# Patient Record
Sex: FEMALE | Race: WHITE | Employment: OTHER | URBAN - METROPOLITAN AREA
[De-identification: names, ages, dates, MRNs, and addresses within clinical notes are randomized per-mention and may not be internally consistent; named-entity substitution may affect disease eponyms.]

---

## 2017-01-11 ENCOUNTER — HOSPITAL ENCOUNTER (EMERGENCY)
Age: 42
Discharge: HOME | End: 2017-01-11
Attending: EMERGENCY MEDICINE
Payer: COMMERCIAL

## 2017-01-11 ENCOUNTER — APPOINTMENT (OUTPATIENT)
Dept: GENERAL RADIOLOGY | Age: 42
End: 2017-01-11
Attending: EMERGENCY MEDICINE
Payer: COMMERCIAL

## 2017-01-11 ENCOUNTER — APPOINTMENT (OUTPATIENT)
Dept: CV DIAGNOSTICS | Age: 42
End: 2017-01-11
Attending: EMERGENCY MEDICINE
Payer: COMMERCIAL

## 2017-01-11 VITALS
HEIGHT: 63 IN | RESPIRATION RATE: 16 BRPM | OXYGEN SATURATION: 96 % | BODY MASS INDEX: 31.89 KG/M2 | TEMPERATURE: 99 F | DIASTOLIC BLOOD PRESSURE: 77 MMHG | HEART RATE: 86 BPM | SYSTOLIC BLOOD PRESSURE: 129 MMHG | WEIGHT: 180 LBS

## 2017-01-11 DIAGNOSIS — R06.00 DYSPNEA ON EXERTION: Primary | ICD-10-CM

## 2017-01-11 LAB
ALBUMIN SERPL-MCNC: 3.9 G/DL (ref 3.5–4.8)
ALP LIVER SERPL-CCNC: 66 U/L (ref 37–98)
ALT SERPL-CCNC: 28 U/L (ref 14–54)
AST SERPL-CCNC: 14 U/L (ref 15–41)
ATRIAL RATE: 67 BPM
BASOPHILS # BLD AUTO: 0.04 X10(3) UL (ref 0–0.1)
BASOPHILS NFR BLD AUTO: 0.5 %
BILIRUB SERPL-MCNC: 0.4 MG/DL (ref 0.1–2)
BUN BLD-MCNC: 12 MG/DL (ref 8–20)
CALCIUM BLD-MCNC: 8.9 MG/DL (ref 8.3–10.3)
CHLORIDE: 104 MMOL/L (ref 101–111)
CO2: 26 MMOL/L (ref 22–32)
CREAT BLD-MCNC: 0.75 MG/DL (ref 0.55–1.02)
D-DIMER: <0.27 UG/ML FEU (ref 0–0.49)
EOSINOPHIL # BLD AUTO: 0.27 X10(3) UL (ref 0–0.3)
EOSINOPHIL NFR BLD AUTO: 3.4 %
ERYTHROCYTE [DISTWIDTH] IN BLOOD BY AUTOMATED COUNT: 12.8 % (ref 11.5–16)
GLUCOSE BLD-MCNC: 108 MG/DL (ref 70–99)
HCT VFR BLD AUTO: 37.8 % (ref 34–50)
HGB BLD-MCNC: 12.6 G/DL (ref 12–16)
IMMATURE GRANULOCYTE COUNT: 0.02 X10(3) UL (ref 0–1)
IMMATURE GRANULOCYTE RATIO %: 0.3 %
LYMPHOCYTES # BLD AUTO: 2.13 X10(3) UL (ref 0.9–4)
LYMPHOCYTES NFR BLD AUTO: 26.8 %
M PROTEIN MFR SERPL ELPH: 7.9 G/DL (ref 6.1–8.3)
MCH RBC QN AUTO: 28.6 PG (ref 27–33.2)
MCHC RBC AUTO-ENTMCNC: 33.3 G/DL (ref 31–37)
MCV RBC AUTO: 85.7 FL (ref 81–100)
MONOCYTES # BLD AUTO: 0.42 X10(3) UL (ref 0.1–0.6)
MONOCYTES NFR BLD AUTO: 5.3 %
NEUTROPHIL ABS PRELIM: 5.06 X10 (3) UL (ref 1.3–6.7)
NEUTROPHILS # BLD AUTO: 5.06 X10(3) UL (ref 1.3–6.7)
NEUTROPHILS NFR BLD AUTO: 63.7 %
P AXIS: 39 DEGREES
P-R INTERVAL: 160 MS
PLATELET # BLD AUTO: 306 10(3)UL (ref 150–450)
POTASSIUM SERPL-SCNC: 4 MMOL/L (ref 3.6–5.1)
PRO-BETA NATRIURETIC PEPTIDE: 22 PG/ML (ref ?–125)
Q-T INTERVAL: 398 MS
QRS DURATION: 94 MS
QTC CALCULATION (BEZET): 420 MS
R AXIS: 47 DEGREES
RBC # BLD AUTO: 4.41 X10(6)UL (ref 3.8–5.1)
RED CELL DISTRIBUTION WIDTH-SD: 40 FL (ref 35.1–46.3)
SODIUM SERPL-SCNC: 138 MMOL/L (ref 136–144)
T AXIS: 5 DEGREES
TROPONIN: <0.046 NG/ML (ref ?–0.05)
VENTRICULAR RATE: 67 BPM
WBC # BLD AUTO: 7.9 X10(3) UL (ref 4–13)

## 2017-01-11 PROCEDURE — 83880 ASSAY OF NATRIURETIC PEPTIDE: CPT | Performed by: EMERGENCY MEDICINE

## 2017-01-11 PROCEDURE — 85378 FIBRIN DEGRADE SEMIQUANT: CPT | Performed by: EMERGENCY MEDICINE

## 2017-01-11 PROCEDURE — 84484 ASSAY OF TROPONIN QUANT: CPT | Performed by: EMERGENCY MEDICINE

## 2017-01-11 PROCEDURE — 93005 ELECTROCARDIOGRAM TRACING: CPT

## 2017-01-11 PROCEDURE — 85025 COMPLETE CBC W/AUTO DIFF WBC: CPT | Performed by: EMERGENCY MEDICINE

## 2017-01-11 PROCEDURE — 93017 CV STRESS TEST TRACING ONLY: CPT

## 2017-01-11 PROCEDURE — 93010 ELECTROCARDIOGRAM REPORT: CPT

## 2017-01-11 PROCEDURE — 71020 XR CHEST PA + LAT CHEST (CPT=71020): CPT

## 2017-01-11 PROCEDURE — 93350 STRESS TTE ONLY: CPT | Performed by: INTERNAL MEDICINE

## 2017-01-11 PROCEDURE — 96374 THER/PROPH/DIAG INJ IV PUSH: CPT

## 2017-01-11 PROCEDURE — 93018 CV STRESS TEST I&R ONLY: CPT | Performed by: INTERNAL MEDICINE

## 2017-01-11 PROCEDURE — 99285 EMERGENCY DEPT VISIT HI MDM: CPT

## 2017-01-11 PROCEDURE — 80053 COMPREHEN METABOLIC PANEL: CPT | Performed by: EMERGENCY MEDICINE

## 2017-01-11 PROCEDURE — 93350 STRESS TTE ONLY: CPT

## 2017-01-11 NOTE — PROGRESS NOTES
CARDIAC DIAGNOSTICS PRELIMINARY REPORT    No ST changes noted before, during, or after exercise. Rest: heart rate was 71 BPM, blood pressure was 118/74 mmHg, EKG showed NSR    Patient exercised for 9:55 minutes on a Adi protocol.  Patient achieved peak

## 2017-01-11 NOTE — ED INITIAL ASSESSMENT (HPI)
Couple days ago, felt \"pounding\" in the chest, felt \"crappy\" afterwards. Denies any cold symptoms. Currently feels \"tightness\" to chest. Non radiating. No YOSVANY.  Skin PWD

## 2017-01-11 NOTE — ED PROVIDER NOTES
Patient Seen in: [facility] Emergency Department In [place]    History   Patient presents with:  Chest Pain Angina (cardiovascular)    Stated Complaint: chest pain/sob    HPI    54-year-old white female who presents to the emergency room today for complai Grandmother    • Obesity Maternal Grandmother    • Psychiatric Maternal Grandfather    • Heart Disorder Maternal Grandfather          Smoking Status: Never Smoker                      Smokeless Status: Never Used                        Alcohol Use: Yes Normal   D-DIMER - Normal    Narrative:      FEU = Fibrinogen Equivalent Units.     In non-pregnant females:  D-Dimer results of less than 0.5 ug/mL (FEU) have been shown to contribute to  the exclusion of venous thrombolism with a negative predictive value the monitor. Her workup here was unremarkable. Patient is not anemic her d-dimer is negative her chest x-ray is negative.   Troponin is negative EKG was normal.  I spoke to the cardiologist who is on-call for Chad 2 and we discussed the case

## 2017-02-07 PROBLEM — Z86.19 HISTORY OF LYME DISEASE: Status: ACTIVE | Noted: 2017-02-07

## 2017-02-07 PROCEDURE — 36415 COLL VENOUS BLD VENIPUNCTURE: CPT | Performed by: PHYSICIAN ASSISTANT

## 2017-02-07 PROCEDURE — 84443 ASSAY THYROID STIM HORMONE: CPT | Performed by: PHYSICIAN ASSISTANT

## 2017-02-20 PROBLEM — K21.9 LPRD (LARYNGOPHARYNGEAL REFLUX DISEASE): Status: ACTIVE | Noted: 2017-02-20

## 2017-02-20 PROBLEM — R09.A2 GLOBUS PHARYNGEUS: Status: ACTIVE | Noted: 2017-02-20

## 2017-02-20 PROBLEM — R09.89 GLOBUS PHARYNGEUS: Status: ACTIVE | Noted: 2017-02-20

## 2017-02-20 PROBLEM — R19.8 GLOBUS PHARYNGEUS: Status: ACTIVE | Noted: 2017-02-20

## 2019-05-23 PROCEDURE — 87624 HPV HI-RISK TYP POOLED RSLT: CPT | Performed by: OBSTETRICS & GYNECOLOGY

## 2019-05-23 PROCEDURE — 82157 ASSAY OF ANDROSTENEDIONE: CPT | Performed by: OBSTETRICS & GYNECOLOGY

## 2019-05-23 PROCEDURE — 84403 ASSAY OF TOTAL TESTOSTERONE: CPT | Performed by: OBSTETRICS & GYNECOLOGY

## 2019-05-23 PROCEDURE — 84402 ASSAY OF FREE TESTOSTERONE: CPT | Performed by: OBSTETRICS & GYNECOLOGY

## 2019-05-23 PROCEDURE — 88175 CYTOPATH C/V AUTO FLUID REDO: CPT | Performed by: OBSTETRICS & GYNECOLOGY

## 2019-05-23 PROCEDURE — 83498 ASY HYDROXYPROGESTERONE 17-D: CPT | Performed by: OBSTETRICS & GYNECOLOGY

## 2019-07-03 PROCEDURE — 88305 TISSUE EXAM BY PATHOLOGIST: CPT | Performed by: OBSTETRICS & GYNECOLOGY

## 2021-04-08 RX ORDER — MAGNESIUM GLUCONATE 27 MG(500)
400 TABLET ORAL 2 TIMES DAILY
COMMUNITY

## 2021-04-09 ENCOUNTER — HOSPITAL ENCOUNTER (OUTPATIENT)
Dept: GENERAL RADIOLOGY | Age: 46
Discharge: HOME | End: 2021-04-09
Attending: SPECIALIST
Payer: COMMERCIAL

## 2021-04-09 DIAGNOSIS — R05.9 COUGH: ICD-10-CM

## 2021-04-09 PROCEDURE — 71046 X-RAY EXAM CHEST 2 VIEWS: CPT | Performed by: SPECIALIST

## 2021-04-13 ENCOUNTER — LAB ENCOUNTER (OUTPATIENT)
Dept: LAB | Facility: HOSPITAL | Age: 46
End: 2021-04-13
Attending: SPECIALIST
Payer: COMMERCIAL

## 2021-04-16 ENCOUNTER — HOSPITAL ENCOUNTER (OUTPATIENT)
Dept: GENERAL RADIOLOGY | Facility: HOSPITAL | Age: 46
Discharge: HOME | End: 2021-04-16
Attending: SPECIALIST
Payer: COMMERCIAL

## 2021-04-16 ENCOUNTER — NURSE ONLY (OUTPATIENT)
Dept: LAB | Facility: HOSPITAL | Age: 46
End: 2021-04-16
Attending: SPECIALIST
Payer: COMMERCIAL

## 2021-04-16 VITALS
WEIGHT: 208 LBS | RESPIRATION RATE: 18 BRPM | DIASTOLIC BLOOD PRESSURE: 68 MMHG | HEART RATE: 43 BPM | BODY MASS INDEX: 36.86 KG/M2 | HEIGHT: 63 IN | SYSTOLIC BLOOD PRESSURE: 135 MMHG | TEMPERATURE: 98 F | OXYGEN SATURATION: 100 %

## 2021-04-16 DIAGNOSIS — Z01.812 PRE-PROCEDURE LAB EXAM: Primary | ICD-10-CM

## 2021-04-16 DIAGNOSIS — R51.9 HEADACHE: ICD-10-CM

## 2021-04-16 DIAGNOSIS — R51.9 HEADACHE: Primary | ICD-10-CM

## 2021-04-16 LAB
INR BLD: 1.06 (ref 0.89–1.11)
PSA SERPL DL<=0.01 NG/ML-MCNC: 14.1 SECONDS (ref 12.4–14.6)

## 2021-04-16 PROCEDURE — 87205 SMEAR GRAM STAIN: CPT

## 2021-04-16 PROCEDURE — 86403 PARTICLE AGGLUT ANTBDY SCRN: CPT

## 2021-04-16 PROCEDURE — 87070 CULTURE OTHR SPECIMN AEROBIC: CPT

## 2021-04-16 PROCEDURE — 36415 COLL VENOUS BLD VENIPUNCTURE: CPT

## 2021-04-16 PROCEDURE — 84157 ASSAY OF PROTEIN OTHER: CPT

## 2021-04-16 PROCEDURE — 87116 MYCOBACTERIA CULTURE: CPT

## 2021-04-16 PROCEDURE — 89050 BODY FLUID CELL COUNT: CPT

## 2021-04-16 PROCEDURE — 88108 CYTOPATH CONCENTRATE TECH: CPT

## 2021-04-16 PROCEDURE — 87206 SMEAR FLUORESCENT/ACID STAI: CPT

## 2021-04-16 PROCEDURE — 82945 GLUCOSE OTHER FLUID: CPT

## 2021-04-16 PROCEDURE — 85610 PROTHROMBIN TIME: CPT

## 2021-04-16 PROCEDURE — 62328 DX LMBR SPI PNXR W/FLUOR/CT: CPT | Performed by: SPECIALIST

## 2021-04-21 ENCOUNTER — LAB ENCOUNTER (OUTPATIENT)
Dept: LAB | Facility: HOSPITAL | Age: 46
End: 2021-04-21
Attending: INTERNAL MEDICINE
Payer: COMMERCIAL

## 2021-04-21 ENCOUNTER — OFFICE VISIT (OUTPATIENT)
Dept: RHEUMATOLOGY | Facility: CLINIC | Age: 46
End: 2021-04-21
Payer: COMMERCIAL

## 2021-04-21 VITALS
SYSTOLIC BLOOD PRESSURE: 134 MMHG | RESPIRATION RATE: 16 BRPM | DIASTOLIC BLOOD PRESSURE: 84 MMHG | HEART RATE: 62 BPM | WEIGHT: 208 LBS | HEIGHT: 63 IN | BODY MASS INDEX: 36.86 KG/M2

## 2021-04-21 DIAGNOSIS — M79.642 BILATERAL HAND PAIN: Primary | ICD-10-CM

## 2021-04-21 DIAGNOSIS — M79.641 BILATERAL HAND PAIN: Primary | ICD-10-CM

## 2021-04-21 DIAGNOSIS — R76.8 POSITIVE ANA (ANTINUCLEAR ANTIBODY): ICD-10-CM

## 2021-04-21 DIAGNOSIS — M79.642 BILATERAL HAND PAIN: ICD-10-CM

## 2021-04-21 DIAGNOSIS — M79.641 BILATERAL HAND PAIN: ICD-10-CM

## 2021-04-21 PROCEDURE — 86140 C-REACTIVE PROTEIN: CPT | Performed by: INTERNAL MEDICINE

## 2021-04-21 PROCEDURE — 86431 RHEUMATOID FACTOR QUANT: CPT | Performed by: INTERNAL MEDICINE

## 2021-04-21 PROCEDURE — 86235 NUCLEAR ANTIGEN ANTIBODY: CPT

## 2021-04-21 PROCEDURE — 99244 OFF/OP CNSLTJ NEW/EST MOD 40: CPT | Performed by: INTERNAL MEDICINE

## 2021-04-21 PROCEDURE — 86160 COMPLEMENT ANTIGEN: CPT | Performed by: INTERNAL MEDICINE

## 2021-04-21 PROCEDURE — 3079F DIAST BP 80-89 MM HG: CPT | Performed by: INTERNAL MEDICINE

## 2021-04-21 PROCEDURE — 3075F SYST BP GE 130 - 139MM HG: CPT | Performed by: INTERNAL MEDICINE

## 2021-04-21 PROCEDURE — 86225 DNA ANTIBODY NATIVE: CPT

## 2021-04-21 PROCEDURE — 86038 ANTINUCLEAR ANTIBODIES: CPT

## 2021-04-21 PROCEDURE — 86039 ANTINUCLEAR ANTIBODIES (ANA): CPT

## 2021-04-21 PROCEDURE — 3008F BODY MASS INDEX DOCD: CPT | Performed by: INTERNAL MEDICINE

## 2021-04-21 PROCEDURE — 86200 CCP ANTIBODY: CPT | Performed by: INTERNAL MEDICINE

## 2021-04-21 PROCEDURE — 36415 COLL VENOUS BLD VENIPUNCTURE: CPT

## 2021-04-21 PROCEDURE — 85652 RBC SED RATE AUTOMATED: CPT | Performed by: INTERNAL MEDICINE

## 2021-04-21 NOTE — PROGRESS NOTES
Valente Graf is a 51year old female who presents for Patient presents with:  Consult: Benign intracranial hypertension   .    HPI:   CC: hx of +EMPERATRIZ, fibromyalgia   Consult: referred by MAYA Fitzgerald  Neurologist Dr. Asif Hernandez ([address])    Sridhar Quinones occurred during her menstrual cycle. Blood work:  2013: +EMPERATRIZ, dsDNA 20, ssDNA 179, [employer].  Normal RF, CCP, ESR and CRP  5/2020: normal ESR and CRP  XR R hand: Small focal cortical erosion along the volar aspect of the base of the right third proxima Grandmother    • Lipids Maternal Grandmother    • Obesity Maternal Grandmother    • Arthritis Maternal Grandmother    • Psychiatric Maternal Grandfather    • Heart Disorder Maternal Grandfather    • Ear Problems Maternal Grandfather         mastoid   • Can 5th PIP with bone spur  Wrist: FROM, no pain or swelling or warmth on palpation  Elbow: FROM, no pain or swelling or warmth on palpation  Shoulders: FROM, no pain or swelling or warmth on palpation  Hip: normal log roll, no lateral hip pain, RAPHAEL test neg 1. Small focal cortical erosion along the volar aspect of the base of the right third proximal phalanx is new compared to the January 2014 radiographs.  Possible etiologies include sequela of prior trauma or an inflammatory arthritis such as gout or psori

## 2021-09-14 PROBLEM — R20.9 SKIN SENSATION DISTURBANCE: Status: ACTIVE | Noted: 2021-09-14

## 2021-09-14 PROBLEM — G93.2 BENIGN INTRACRANIAL HYPERTENSION: Status: ACTIVE | Noted: 2021-09-14

## 2021-09-14 PROBLEM — G43.719 INTRACTABLE CHRONIC MIGRAINE WITHOUT AURA: Status: ACTIVE | Noted: 2021-09-14

## 2021-09-14 PROBLEM — M79.602 PAIN IN LEFT ARM: Status: ACTIVE | Noted: 2021-09-14

## 2021-09-30 NOTE — PATIENT INSTRUCTIONS
You were seen today for joint pain and an erosion on one of your fingers  Lets evaluate for rheumatoid arthritis  Blood work today  Due to history of positive EMPERATRIZ lets also work you up for any connective tissue disease.   Will also get some more blood work
no...

## 2021-12-30 PROBLEM — J45.20 MILD INTERMITTENT ASTHMA WITHOUT COMPLICATION (HCC): Status: ACTIVE | Noted: 2021-12-30

## 2021-12-30 PROBLEM — J45.20 MILD INTERMITTENT ASTHMA WITHOUT COMPLICATION: Status: ACTIVE | Noted: 2021-12-30

## 2021-12-30 PROBLEM — R76.8 ANA POSITIVE: Status: ACTIVE | Noted: 2021-12-30

## 2023-01-21 ENCOUNTER — OFFICE VISIT (OUTPATIENT)
Dept: FAMILY MEDICINE CLINIC | Facility: CLINIC | Age: 48
End: 2023-01-21
Payer: COMMERCIAL

## 2023-01-21 VITALS
OXYGEN SATURATION: 97 % | SYSTOLIC BLOOD PRESSURE: 130 MMHG | WEIGHT: 210 LBS | HEART RATE: 74 BPM | HEIGHT: 63.5 IN | TEMPERATURE: 99 F | BODY MASS INDEX: 36.75 KG/M2 | DIASTOLIC BLOOD PRESSURE: 90 MMHG

## 2023-01-21 DIAGNOSIS — J20.9 ACUTE BRONCHITIS, UNSPECIFIED ORGANISM: Primary | ICD-10-CM

## 2023-01-21 LAB
OPERATOR ID: NORMAL
POCT LOT NUMBER: NORMAL
RAPID SARS-COV-2 BY PCR: NOT DETECTED

## 2023-01-21 PROCEDURE — 3008F BODY MASS INDEX DOCD: CPT | Performed by: PHYSICIAN ASSISTANT

## 2023-01-21 PROCEDURE — 3080F DIAST BP >= 90 MM HG: CPT | Performed by: PHYSICIAN ASSISTANT

## 2023-01-21 PROCEDURE — 99213 OFFICE O/P EST LOW 20 MIN: CPT | Performed by: PHYSICIAN ASSISTANT

## 2023-01-21 PROCEDURE — U0002 COVID-19 LAB TEST NON-CDC: HCPCS | Performed by: PHYSICIAN ASSISTANT

## 2023-01-21 PROCEDURE — 3075F SYST BP GE 130 - 139MM HG: CPT | Performed by: PHYSICIAN ASSISTANT

## 2023-01-21 RX ORDER — BENZONATATE 200 MG/1
200 CAPSULE ORAL 3 TIMES DAILY PRN
Qty: 21 CAPSULE | Refills: 0 | Status: SHIPPED | OUTPATIENT
Start: 2023-01-21 | End: 2023-01-28

## 2023-01-21 RX ORDER — ALBUTEROL SULFATE 90 UG/1
AEROSOL, METERED RESPIRATORY (INHALATION)
Qty: 1 EACH | Refills: 0 | Status: SHIPPED | OUTPATIENT
Start: 2023-01-21

## 2023-01-23 ENCOUNTER — APPOINTMENT (OUTPATIENT)
Dept: GENERAL RADIOLOGY | Age: 48
End: 2023-01-23
Attending: PHYSICIAN ASSISTANT
Payer: COMMERCIAL

## 2023-01-23 ENCOUNTER — HOSPITAL ENCOUNTER (OUTPATIENT)
Age: 48
Discharge: HOME | End: 2023-01-23
Payer: COMMERCIAL

## 2023-01-23 VITALS
SYSTOLIC BLOOD PRESSURE: 133 MMHG | RESPIRATION RATE: 24 BRPM | DIASTOLIC BLOOD PRESSURE: 95 MMHG | OXYGEN SATURATION: 100 % | HEART RATE: 98 BPM | TEMPERATURE: 99 F

## 2023-01-23 DIAGNOSIS — J34.89 NASAL CONGESTION WITH RHINORRHEA: ICD-10-CM

## 2023-01-23 DIAGNOSIS — R09.81 NASAL CONGESTION WITH RHINORRHEA: ICD-10-CM

## 2023-01-23 DIAGNOSIS — R05.3 PERSISTENT COUGH: Primary | ICD-10-CM

## 2023-01-23 LAB — SARS-COV-2 RNA RESP QL NAA+PROBE: NOT DETECTED

## 2023-01-23 PROCEDURE — 94640 AIRWAY INHALATION TREATMENT: CPT

## 2023-01-23 PROCEDURE — 99204 OFFICE O/P NEW MOD 45 MIN: CPT

## 2023-01-23 PROCEDURE — 71046 X-RAY EXAM CHEST 2 VIEWS: CPT | Performed by: PHYSICIAN ASSISTANT

## 2023-01-23 PROCEDURE — 99214 OFFICE O/P EST MOD 30 MIN: CPT

## 2023-01-23 RX ORDER — MONTELUKAST SODIUM 10 MG/1
10 TABLET ORAL NIGHTLY
Qty: 30 TABLET | Refills: 0 | Status: SHIPPED | OUTPATIENT
Start: 2023-01-23

## 2023-01-23 RX ORDER — PREDNISONE 20 MG/1
40 TABLET ORAL DAILY
Qty: 6 TABLET | Refills: 0 | Status: SHIPPED | OUTPATIENT
Start: 2023-01-23 | End: 2023-01-29

## 2023-01-23 RX ORDER — ALBUTEROL SULFATE 2.5 MG/3ML
2.5 SOLUTION RESPIRATORY (INHALATION) EVERY 4 HOURS PRN
Qty: 30 EACH | Refills: 0 | Status: SHIPPED | OUTPATIENT
Start: 2023-01-23 | End: 2023-02-22

## 2023-01-23 RX ORDER — IPRATROPIUM BROMIDE AND ALBUTEROL SULFATE 2.5; .5 MG/3ML; MG/3ML
3 SOLUTION RESPIRATORY (INHALATION) ONCE
Status: COMPLETED | OUTPATIENT
Start: 2023-01-23 | End: 2023-01-23

## 2023-01-23 RX ORDER — CODEINE PHOSPHATE AND GUAIFENESIN 10; 100 MG/5ML; MG/5ML
10 SOLUTION ORAL NIGHTLY PRN
Qty: 200 ML | Refills: 0 | Status: SHIPPED | OUTPATIENT
Start: 2023-01-23

## 2023-01-23 RX ORDER — DEXAMETHASONE 4 MG/1
16 TABLET ORAL ONCE
Status: COMPLETED | OUTPATIENT
Start: 2023-01-23 | End: 2023-01-23

## 2023-01-23 NOTE — DISCHARGE INSTRUCTIONS
Please return to the ER/clinic if symptoms worsen. Follow-up with your PCP in 24-48 hours as needed. Use your nebulizer every 4-6 hours as needed. The Decadron will remain in your system the next several days. You may start the additional prednisone on day 2 or 3 after discussing it with your neurologist.  Take the Singulair daily. You may also concurrently take Zyrtec at night if you do the Singulair in the morning. Drink plenty of fluids. Sleep more upright. Recommend Cepacol over-the-counter to help with the repetitive cough reflex. You may use a codeine cough syrup at night but allow 8 hours for operating machinery. If anything changes i.e. increasing fever shortness of breath etc. go directly to the emergency room. Otherwise follow-up with your primary care physician for further evaluation and treatment.

## 2023-01-23 NOTE — ED INITIAL ASSESSMENT (HPI)
C/O COUGH THAT STARTED 4 DAYS. PT REPORTS GOING TO QUICK CARE AND BEING TREATED. PT REPORTS TAKING PRESCRIBED MEDS WITH NO RELIEF OF SYMPTOMS. SYMPTOMS INCLUDED. COUGH, SOB, FEVER, VOICE CHANGE, NASAL CONGESTION.

## 2023-01-25 ENCOUNTER — HOSPITAL ENCOUNTER (INPATIENT)
Facility: HOSPITAL | Age: 48
LOS: 4 days | Discharge: HOME | End: 2023-01-29
Attending: EMERGENCY MEDICINE | Admitting: HOSPITALIST
Payer: COMMERCIAL

## 2023-01-25 ENCOUNTER — APPOINTMENT (OUTPATIENT)
Dept: GENERAL RADIOLOGY | Facility: HOSPITAL | Age: 48
End: 2023-01-25
Payer: COMMERCIAL

## 2023-01-25 DIAGNOSIS — J20.9 ACUTE BRONCHITIS, UNSPECIFIED ORGANISM: ICD-10-CM

## 2023-01-25 DIAGNOSIS — J45.909 ACUTE ASTHMA: Primary | ICD-10-CM

## 2023-01-25 PROBLEM — R79.89 AZOTEMIA: Status: ACTIVE | Noted: 2023-01-25

## 2023-01-25 PROBLEM — R73.9 HYPERGLYCEMIA: Status: ACTIVE | Noted: 2023-01-25

## 2023-01-25 LAB
ADENOVIRUS PCR:: NOT DETECTED
ALBUMIN SERPL-MCNC: 3.8 G/DL (ref 3.4–5)
ALBUMIN/GLOB SERPL: 1.1 {RATIO} (ref 1–2)
ALP LIVER SERPL-CCNC: 77 U/L
ALT SERPL-CCNC: 45 U/L
ANION GAP SERPL CALC-SCNC: 4 MMOL/L (ref 0–18)
AST SERPL-CCNC: 27 U/L (ref 15–37)
B PARAPERT DNA SPEC QL NAA+PROBE: NOT DETECTED
B PERT DNA SPEC QL NAA+PROBE: NOT DETECTED
BASOPHILS # BLD AUTO: 0.02 X10(3) UL (ref 0–0.2)
BASOPHILS NFR BLD AUTO: 0.2 %
BILIRUB SERPL-MCNC: 0.4 MG/DL (ref 0.1–2)
BUN BLD-MCNC: 20 MG/DL (ref 7–18)
C PNEUM DNA SPEC QL NAA+PROBE: NOT DETECTED
CALCIUM BLD-MCNC: 8.8 MG/DL (ref 8.5–10.1)
CHLORIDE SERPL-SCNC: 108 MMOL/L (ref 98–112)
CO2 SERPL-SCNC: 29 MMOL/L (ref 21–32)
CORONAVIRUS 229E PCR:: NOT DETECTED
CORONAVIRUS HKU1 PCR:: NOT DETECTED
CORONAVIRUS NL63 PCR:: NOT DETECTED
CORONAVIRUS OC43 PCR:: NOT DETECTED
CREAT BLD-MCNC: 0.88 MG/DL
EOSINOPHIL # BLD AUTO: 0.09 X10(3) UL (ref 0–0.7)
EOSINOPHIL NFR BLD AUTO: 0.9 %
ERYTHROCYTE [DISTWIDTH] IN BLOOD BY AUTOMATED COUNT: 13.2 %
FLUAV + FLUBV RNA SPEC NAA+PROBE: NEGATIVE
FLUAV + FLUBV RNA SPEC NAA+PROBE: NEGATIVE
FLUAV RNA SPEC QL NAA+PROBE: NOT DETECTED
FLUBV RNA SPEC QL NAA+PROBE: NOT DETECTED
GFR SERPLBLD BASED ON 1.73 SQ M-ARVRAT: 81 ML/MIN/1.73M2 (ref 60–?)
GLOBULIN PLAS-MCNC: 3.6 G/DL (ref 2.8–4.4)
GLUCOSE BLD-MCNC: 114 MG/DL (ref 70–99)
HCT VFR BLD AUTO: 35.8 %
HGB BLD-MCNC: 12.2 G/DL
IMM GRANULOCYTES # BLD AUTO: 0.03 X10(3) UL (ref 0–1)
IMM GRANULOCYTES NFR BLD: 0.3 %
LYMPHOCYTES # BLD AUTO: 2.42 X10(3) UL (ref 1–4)
LYMPHOCYTES NFR BLD AUTO: 23.3 %
MCH RBC QN AUTO: 29 PG (ref 26–34)
MCHC RBC AUTO-ENTMCNC: 34.1 G/DL (ref 31–37)
MCV RBC AUTO: 85.2 FL
METAPNEUMOVIRUS PCR:: DETECTED
MONOCYTES # BLD AUTO: 0.54 X10(3) UL (ref 0.1–1)
MONOCYTES NFR BLD AUTO: 5.2 %
MYCOPLASMA PNEUMONIA PCR:: NOT DETECTED
NEUTROPHILS # BLD AUTO: 7.28 X10 (3) UL (ref 1.5–7.7)
NEUTROPHILS # BLD AUTO: 7.28 X10(3) UL (ref 1.5–7.7)
NEUTROPHILS NFR BLD AUTO: 70.1 %
OSMOLALITY SERPL CALC.SUM OF ELEC: 295 MOSM/KG (ref 275–295)
PARAINFLUENZA 1 PCR:: NOT DETECTED
PARAINFLUENZA 2 PCR:: NOT DETECTED
PARAINFLUENZA 3 PCR:: NOT DETECTED
PARAINFLUENZA 4 PCR:: NOT DETECTED
PLATELET # BLD AUTO: 260 10(3)UL (ref 150–450)
POTASSIUM SERPL-SCNC: 3.6 MMOL/L (ref 3.5–5.1)
PROT SERPL-MCNC: 7.4 G/DL (ref 6.4–8.2)
RBC # BLD AUTO: 4.2 X10(6)UL
RHINOVIRUS/ENTERO PCR:: NOT DETECTED
RSV RNA SPEC NAA+PROBE: NEGATIVE
RSV RNA SPEC QL NAA+PROBE: NOT DETECTED
SARS-COV-2 RNA NPH QL NAA+NON-PROBE: NOT DETECTED
SARS-COV-2 RNA RESP QL NAA+PROBE: NOT DETECTED
SODIUM SERPL-SCNC: 141 MMOL/L (ref 136–145)
WBC # BLD AUTO: 10.4 X10(3) UL (ref 4–11)

## 2023-01-25 PROCEDURE — 99291 CRITICAL CARE FIRST HOUR: CPT

## 2023-01-25 PROCEDURE — 94640 AIRWAY INHALATION TREATMENT: CPT

## 2023-01-25 PROCEDURE — 36415 COLL VENOUS BLD VENIPUNCTURE: CPT

## 2023-01-25 PROCEDURE — 80053 COMPREHEN METABOLIC PANEL: CPT | Performed by: EMERGENCY MEDICINE

## 2023-01-25 PROCEDURE — 0202U NFCT DS 22 TRGT SARS-COV-2: CPT | Performed by: HOSPITALIST

## 2023-01-25 PROCEDURE — 71045 X-RAY EXAM CHEST 1 VIEW: CPT | Performed by: EMERGENCY MEDICINE

## 2023-01-25 PROCEDURE — 0241U SARS-COV-2/FLU A AND B/RSV BY PCR (GENEXPERT): CPT | Performed by: EMERGENCY MEDICINE

## 2023-01-25 PROCEDURE — 99285 EMERGENCY DEPT VISIT HI MDM: CPT

## 2023-01-25 PROCEDURE — 85025 COMPLETE CBC W/AUTO DIFF WBC: CPT | Performed by: EMERGENCY MEDICINE

## 2023-01-25 RX ORDER — ALBUTEROL SULFATE 90 UG/1
8 AEROSOL, METERED RESPIRATORY (INHALATION) 4 TIMES DAILY
Status: DISCONTINUED | OUTPATIENT
Start: 2023-01-25 | End: 2023-01-25

## 2023-01-25 RX ORDER — ONDANSETRON 2 MG/ML
4 INJECTION INTRAMUSCULAR; INTRAVENOUS EVERY 6 HOURS PRN
Status: DISCONTINUED | OUTPATIENT
Start: 2023-01-25 | End: 2023-01-29

## 2023-01-25 RX ORDER — ONDANSETRON 2 MG/ML
4 INJECTION INTRAMUSCULAR; INTRAVENOUS EVERY 4 HOURS PRN
Status: ACTIVE | OUTPATIENT
Start: 2023-01-25 | End: 2023-01-25

## 2023-01-25 RX ORDER — HEPARIN SODIUM 5000 [USP'U]/ML
5000 INJECTION, SOLUTION INTRAVENOUS; SUBCUTANEOUS EVERY 8 HOURS SCHEDULED
Status: DISCONTINUED | OUTPATIENT
Start: 2023-01-25 | End: 2023-01-27

## 2023-01-25 RX ORDER — PROCHLORPERAZINE EDISYLATE 5 MG/ML
5 INJECTION INTRAMUSCULAR; INTRAVENOUS EVERY 8 HOURS PRN
Status: DISCONTINUED | OUTPATIENT
Start: 2023-01-25 | End: 2023-01-29

## 2023-01-25 RX ORDER — METHYLPREDNISOLONE SODIUM SUCCINATE 125 MG/2ML
60 INJECTION, POWDER, LYOPHILIZED, FOR SOLUTION INTRAMUSCULAR; INTRAVENOUS EVERY 8 HOURS
Status: DISCONTINUED | OUTPATIENT
Start: 2023-01-26 | End: 2023-01-29

## 2023-01-25 RX ORDER — IPRATROPIUM BROMIDE AND ALBUTEROL SULFATE 2.5; .5 MG/3ML; MG/3ML
3 SOLUTION RESPIRATORY (INHALATION)
Status: DISCONTINUED | OUTPATIENT
Start: 2023-01-25 | End: 2023-01-29

## 2023-01-25 RX ORDER — PREDNISONE 20 MG/1
60 TABLET ORAL ONCE
Status: COMPLETED | OUTPATIENT
Start: 2023-01-25 | End: 2023-01-25

## 2023-01-25 RX ORDER — MONTELUKAST SODIUM 10 MG/1
10 TABLET ORAL NIGHTLY
Status: DISCONTINUED | OUTPATIENT
Start: 2023-01-25 | End: 2023-01-29

## 2023-01-25 RX ORDER — ALBUTEROL SULFATE 2.5 MG/3ML
2.5 SOLUTION RESPIRATORY (INHALATION) EVERY 4 HOURS PRN
Status: DISCONTINUED | OUTPATIENT
Start: 2023-01-25 | End: 2023-01-29

## 2023-01-25 RX ORDER — MAGNESIUM GLUCONATE 27 MG(500)
400 TABLET ORAL 2 TIMES DAILY
Status: DISCONTINUED | OUTPATIENT
Start: 2023-01-25 | End: 2023-01-25 | Stop reason: RX

## 2023-01-25 RX ORDER — BENZONATATE 200 MG/1
200 CAPSULE ORAL 3 TIMES DAILY PRN
Status: DISCONTINUED | OUTPATIENT
Start: 2023-01-25 | End: 2023-01-29

## 2023-01-25 RX ORDER — CETIRIZINE HYDROCHLORIDE 10 MG/1
10 TABLET ORAL DAILY
Status: DISCONTINUED | OUTPATIENT
Start: 2023-01-26 | End: 2023-01-29

## 2023-01-25 RX ORDER — METHYLPREDNISOLONE SODIUM SUCCINATE 125 MG/2ML
60 INJECTION, POWDER, LYOPHILIZED, FOR SOLUTION INTRAMUSCULAR; INTRAVENOUS EVERY 8 HOURS
Status: DISCONTINUED | OUTPATIENT
Start: 2023-01-26 | End: 2023-01-25

## 2023-01-25 RX ORDER — PREDNISONE 20 MG/1
40 TABLET ORAL DAILY
Qty: 10 TABLET | Refills: 0 | Status: SHIPPED | OUTPATIENT
Start: 2023-01-25 | End: 2023-01-29

## 2023-01-25 RX ORDER — CODEINE PHOSPHATE AND GUAIFENESIN 10; 100 MG/5ML; MG/5ML
5 SOLUTION ORAL EVERY 4 HOURS PRN
Status: DISCONTINUED | OUTPATIENT
Start: 2023-01-25 | End: 2023-01-29

## 2023-01-25 RX ORDER — ACETAMINOPHEN 500 MG
500 TABLET ORAL EVERY 4 HOURS PRN
Status: DISCONTINUED | OUTPATIENT
Start: 2023-01-25 | End: 2023-01-29

## 2023-01-25 NOTE — PROGRESS NOTES
NURSING ADMISSION NOTE      Patient admitted via Cart  Oriented to room. Safety precautions initiated. Bed in low position. Call light in reach. Received pt around 1730. Pt AOx4. VSS on RA. Tele NSR/ST with exertion. Admission ubaldo completed. Regular diet. Pt up ad shashi.

## 2023-01-25 NOTE — ED PROVIDER NOTES
Post hour-long continuous neb patient still had mild expiratory wheezes. Patient has been on prednisone already. Patient did not feel comfortable going home. Patient be admitted for further evaluation.   Did speak with the duly hospitalist.

## 2023-01-25 NOTE — ED QUICK NOTES
Orders for admission, patient is aware of plan and ready to go upstairs.  Any questions, please call ED RN Nataliya Johnson at extension [phone]     Patient Covid vaccination status: Partially vaccinated     COVID Test Ordered in ED: SARS-CoV-2/Flu A and B/RSV by PCR (GeneXpert) WAS NEG    COVID Suspicion at Admission: N/A    Running Infusions:  None    Mental Status/LOC at time of transport: A&OX3    Other pertinent information:   CIWA score: N/A   NIH score:  N/A

## 2023-01-26 LAB
ANION GAP SERPL CALC-SCNC: 6 MMOL/L (ref 0–18)
BASOPHILS # BLD AUTO: 0.02 X10(3) UL (ref 0–0.2)
BASOPHILS NFR BLD AUTO: 0.2 %
BUN BLD-MCNC: 13 MG/DL (ref 7–18)
CALCIUM BLD-MCNC: 9.3 MG/DL (ref 8.5–10.1)
CHLORIDE SERPL-SCNC: 104 MMOL/L (ref 98–112)
CO2 SERPL-SCNC: 28 MMOL/L (ref 21–32)
CREAT BLD-MCNC: 0.72 MG/DL
EOSINOPHIL # BLD AUTO: 0 X10(3) UL (ref 0–0.7)
EOSINOPHIL NFR BLD AUTO: 0 %
ERYTHROCYTE [DISTWIDTH] IN BLOOD BY AUTOMATED COUNT: 13.1 %
GFR SERPLBLD BASED ON 1.73 SQ M-ARVRAT: 103 ML/MIN/1.73M2 (ref 60–?)
GLUCOSE BLD-MCNC: 169 MG/DL (ref 70–99)
HCT VFR BLD AUTO: 36.7 %
HGB BLD-MCNC: 12.4 G/DL
IMM GRANULOCYTES # BLD AUTO: 0.07 X10(3) UL (ref 0–1)
IMM GRANULOCYTES NFR BLD: 0.6 %
LYMPHOCYTES # BLD AUTO: 1.41 X10(3) UL (ref 1–4)
LYMPHOCYTES NFR BLD AUTO: 12.5 %
MAGNESIUM SERPL-MCNC: 2.3 MG/DL (ref 1.6–2.6)
MCH RBC QN AUTO: 28.7 PG (ref 26–34)
MCHC RBC AUTO-ENTMCNC: 33.8 G/DL (ref 31–37)
MCV RBC AUTO: 85 FL
MONOCYTES # BLD AUTO: 0.27 X10(3) UL (ref 0.1–1)
MONOCYTES NFR BLD AUTO: 2.4 %
NEUTROPHILS # BLD AUTO: 9.53 X10 (3) UL (ref 1.5–7.7)
NEUTROPHILS # BLD AUTO: 9.53 X10(3) UL (ref 1.5–7.7)
NEUTROPHILS NFR BLD AUTO: 84.3 %
OSMOLALITY SERPL CALC.SUM OF ELEC: 290 MOSM/KG (ref 275–295)
PLATELET # BLD AUTO: 289 10(3)UL (ref 150–450)
POTASSIUM SERPL-SCNC: 4.2 MMOL/L (ref 3.5–5.1)
RBC # BLD AUTO: 4.32 X10(6)UL
SODIUM SERPL-SCNC: 138 MMOL/L (ref 136–145)
WBC # BLD AUTO: 11.3 X10(3) UL (ref 4–11)

## 2023-01-26 PROCEDURE — 94640 AIRWAY INHALATION TREATMENT: CPT

## 2023-01-26 PROCEDURE — 80048 BASIC METABOLIC PNL TOTAL CA: CPT | Performed by: HOSPITALIST

## 2023-01-26 PROCEDURE — 85025 COMPLETE CBC W/AUTO DIFF WBC: CPT | Performed by: HOSPITALIST

## 2023-01-26 PROCEDURE — 83735 ASSAY OF MAGNESIUM: CPT | Performed by: HOSPITALIST

## 2023-01-26 RX ORDER — DIPHENHYDRAMINE HCL 25 MG
25 CAPSULE ORAL ONCE
Status: DISCONTINUED | OUTPATIENT
Start: 2023-01-26 | End: 2023-01-29

## 2023-01-26 RX ORDER — FLUTICASONE FUROATE AND VILANTEROL 100; 25 UG/1; UG/1
1 POWDER RESPIRATORY (INHALATION) DAILY
Status: DISCONTINUED | OUTPATIENT
Start: 2023-01-26 | End: 2023-01-29

## 2023-01-26 RX ORDER — MELATONIN
3 NIGHTLY PRN
Status: DISCONTINUED | OUTPATIENT
Start: 2023-01-26 | End: 2023-01-29

## 2023-01-26 NOTE — PROGRESS NOTES
Pharmacy Note: Dietary Supplement Discontinuation Per Policy    Magnesium gluconate has been discontinued on [employer] per policy. This supplement may be restarted upon discharge using the medication reconciliation process.     Thank you,   Nicholas Trevino, PharmD  1/25/2023,  8:27 PM

## 2023-01-26 NOTE — PROGRESS NOTES
Received pt around 0730. Pt AOx4. VSS on RA. Tele NSR. IV steroids, nebs. Prn cough meds. Pt up ad shashi. Tolerating diet. All questions answered.

## 2023-01-26 NOTE — PLAN OF CARE
Problem: Metapneumoia   Data: Patient AX04. Telemetry-sinus. , on room air. 02 sats 97%. Afebrile. Continent. Saline lock. Ambulatory.    Intervention:IV solu medrol, duobnebs, tessalon, robitussin   Education: Medications, call light   Response: cooperative with care     Problem: Patient/Family Goals  Goal: Patient/Family Long Term Goal  Description: Patient's Long Term Goal: Discharge to home   Interventions:  - Follow plan of care   - See additional Care Plan goals for specific interventions  Outcome: Progressing  Goal: Patient/Family Short Term Goal  Description: Patient's Short Term Goal:   1/26 noc: reduce cough     Interventions:   - Medications, call light,      - See additional Care Plan goals for specific interventions  Outcome: Progressing     Problem: RESPIRATORY - ADULT  Goal: Achieves optimal ventilation and oxygenation  Description: INTERVENTIONS:  - Assess for changes in respiratory status  - Assess for changes in mentation and behavior  - Position to facilitate oxygenation and minimize respiratory effort  - Oxygen supplementation based on oxygen saturation or ABGs  - Provide Smoking Cessation handout, if applicable  - Encourage broncho-pulmonary hygiene including cough, deep breathe, Incentive Spirometry  - Assess the need for suctioning and perform as needed  - Assess and instruct to report SOB or any respiratory difficulty  - Respiratory Therapy support as indicated  - Manage/alleviate anxiety  - Monitor for signs/symptoms of CO2 retention  Outcome: Progressing

## 2023-01-27 LAB
ANION GAP SERPL CALC-SCNC: 8 MMOL/L (ref 0–18)
BASOPHILS # BLD AUTO: 0.04 X10(3) UL (ref 0–0.2)
BASOPHILS NFR BLD AUTO: 0.3 %
BUN BLD-MCNC: 14 MG/DL (ref 7–18)
CALCIUM BLD-MCNC: 9.5 MG/DL (ref 8.5–10.1)
CHLORIDE SERPL-SCNC: 104 MMOL/L (ref 98–112)
CO2 SERPL-SCNC: 25 MMOL/L (ref 21–32)
CREAT BLD-MCNC: 0.85 MG/DL
EOSINOPHIL # BLD AUTO: 0 X10(3) UL (ref 0–0.7)
EOSINOPHIL NFR BLD AUTO: 0 %
ERYTHROCYTE [DISTWIDTH] IN BLOOD BY AUTOMATED COUNT: 13 %
GFR SERPLBLD BASED ON 1.73 SQ M-ARVRAT: 84 ML/MIN/1.73M2 (ref 60–?)
GLUCOSE BLD-MCNC: 171 MG/DL (ref 70–99)
HCT VFR BLD AUTO: 37.3 %
HGB BLD-MCNC: 12.3 G/DL
IMM GRANULOCYTES # BLD AUTO: 0.17 X10(3) UL (ref 0–1)
IMM GRANULOCYTES NFR BLD: 1.4 %
LYMPHOCYTES # BLD AUTO: 1.98 X10(3) UL (ref 1–4)
LYMPHOCYTES NFR BLD AUTO: 16.4 %
MAGNESIUM SERPL-MCNC: 2.4 MG/DL (ref 1.6–2.6)
MCH RBC QN AUTO: 28.5 PG (ref 26–34)
MCHC RBC AUTO-ENTMCNC: 33 G/DL (ref 31–37)
MCV RBC AUTO: 86.5 FL
MONOCYTES # BLD AUTO: 0.44 X10(3) UL (ref 0.1–1)
MONOCYTES NFR BLD AUTO: 3.7 %
NEUTROPHILS # BLD AUTO: 9.41 X10 (3) UL (ref 1.5–7.7)
NEUTROPHILS # BLD AUTO: 9.41 X10(3) UL (ref 1.5–7.7)
NEUTROPHILS NFR BLD AUTO: 78.2 %
OSMOLALITY SERPL CALC.SUM OF ELEC: 289 MOSM/KG (ref 275–295)
PLATELET # BLD AUTO: 292 10(3)UL (ref 150–450)
POTASSIUM SERPL-SCNC: 4.2 MMOL/L (ref 3.5–5.1)
RBC # BLD AUTO: 4.31 X10(6)UL
SODIUM SERPL-SCNC: 137 MMOL/L (ref 136–145)
WBC # BLD AUTO: 12 X10(3) UL (ref 4–11)

## 2023-01-27 PROCEDURE — 85025 COMPLETE CBC W/AUTO DIFF WBC: CPT | Performed by: HOSPITALIST

## 2023-01-27 PROCEDURE — 80048 BASIC METABOLIC PNL TOTAL CA: CPT | Performed by: HOSPITALIST

## 2023-01-27 PROCEDURE — 83735 ASSAY OF MAGNESIUM: CPT | Performed by: HOSPITALIST

## 2023-01-27 PROCEDURE — 94640 AIRWAY INHALATION TREATMENT: CPT

## 2023-01-27 RX ORDER — GUAIFENESIN 600 MG/1
600 TABLET, EXTENDED RELEASE ORAL 2 TIMES DAILY
Status: DISCONTINUED | OUTPATIENT
Start: 2023-01-27 | End: 2023-01-29

## 2023-01-27 RX ORDER — ENOXAPARIN SODIUM 100 MG/ML
40 INJECTION SUBCUTANEOUS NIGHTLY
Status: DISCONTINUED | OUTPATIENT
Start: 2023-01-27 | End: 2023-01-29

## 2023-01-27 RX ORDER — GABAPENTIN 300 MG/1
600 CAPSULE ORAL NIGHTLY
Status: DISCONTINUED | OUTPATIENT
Start: 2023-01-27 | End: 2023-01-29

## 2023-01-27 RX ORDER — HYDROCODONE BITARTRATE AND HOMATROPINE METHYLBROMIDE ORAL SOLUTION 5; 1.5 MG/5ML; MG/5ML
5 LIQUID ORAL EVERY 6 HOURS PRN
Status: DISCONTINUED | OUTPATIENT
Start: 2023-01-27 | End: 2023-01-29

## 2023-01-27 NOTE — PROGRESS NOTES
01/27/23 1155   Mobility   O2 walk?  Yes   SPO2% on Room Air at Rest 95   SPO2% Ambulation on Room Air 91

## 2023-01-27 NOTE — PLAN OF CARE
Problem: Metapneumoia   Data: Patient AX04. Telemetry-sinus rhythm. . Room air. On 1L NC overnight as supplement. Diminished breathe sounds. Strong persistent cough. Prn medication given. Melatonin given for sleep. Afebrile. Continent. Regular diet. Saline lock. Ambulatory.    Intervention: IV solu medrol, duobnebs, tessalon, robitussin   Education: Medications, call light   Response: cooperative with care      Problem: Patient/Family Goals  Goal: Patient/Family Long Term Goal  Description: Patient's Long Term Goal: Discharge to home   Interventions:  - Follow plan of care   - See additional Care Plan goals for specific interventions  Outcome: Progressing  Goal: Patient/Family Short Term Goal  Description: Patient's Short Term Goal:   1/26 noc: reduce cough   1/26 noc: reduce cough   1/27 noc; sleep     Interventions:   - Medications, call light,      - See additional Care Plan goals for specific interventions  Outcome: Progressing     Problem: RESPIRATORY - ADULT  Goal: Achieves optimal ventilation and oxygenation  Description: INTERVENTIONS:  - Assess for changes in respiratory status  - Assess for changes in mentation and behavior  - Position to facilitate oxygenation and minimize respiratory effort  - Oxygen supplementation based on oxygen saturation or ABGs  - Provide Smoking Cessation handout, if applicable  - Encourage broncho-pulmonary hygiene including cough, deep breathe, Incentive Spirometry  - Assess the need for suctioning and perform as needed  - Assess and instruct to report SOB or any respiratory difficulty  - Respiratory Therapy support as indicated  - Manage/alleviate anxiety  - Monitor for signs/symptoms of CO2 retention  Outcome: Progressing

## 2023-01-27 NOTE — PLAN OF CARE
1/27 AM: Pt is A/O x 4, wears glasses. Lung sounds are diminished, exp wheezing. Room air at rest and with exertion. Hydromet and Tessalon for cough. BP and HR are WNL, NSR on tele. BM today, voids, up stand by assist. Updated patient and family on POC, no questions/concerns at this time.      Problem: Patient/Family Goals  Goal: Patient/Family Long Term Goal  Description: Patient's Long Term Goal: Discharge to home   Interventions:  - Follow plan of care   - See additional Care Plan goals for specific interventions  Outcome: Progressing  Goal: Patient/Family Short Term Goal  Description: Patient's Short Term Goal:   1/26 noc: reduce cough   1/26 noc: reduce cough   1/27 noc; sleep   1/27 AM: O2 walk, control cough  Interventions:   - Medications, call light,      - See additional Care Plan goals for specific interventions  Outcome: Progressing     Problem: RESPIRATORY - ADULT  Goal: Achieves optimal ventilation and oxygenation  Description: INTERVENTIONS:  - Assess for changes in respiratory status  - Assess for changes in mentation and behavior  - Position to facilitate oxygenation and minimize respiratory effort  - Oxygen supplementation based on oxygen saturation or ABGs  - Provide Smoking Cessation handout, if applicable  - Encourage broncho-pulmonary hygiene including cough, deep breathe, Incentive Spirometry  - Assess the need for suctioning and perform as needed  - Assess and instruct to report SOB or any respiratory difficulty  - Respiratory Therapy support as indicated  - Manage/alleviate anxiety  - Monitor for signs/symptoms of CO2 retention  Outcome: Progressing

## 2023-01-28 PROCEDURE — 94640 AIRWAY INHALATION TREATMENT: CPT

## 2023-01-28 RX ORDER — POLYETHYLENE GLYCOL 3350 17 G/17G
17 POWDER, FOR SOLUTION ORAL DAILY PRN
Status: DISCONTINUED | OUTPATIENT
Start: 2023-01-28 | End: 2023-01-29

## 2023-01-28 RX ORDER — DOCUSATE SODIUM 100 MG/1
100 CAPSULE, LIQUID FILLED ORAL 2 TIMES DAILY PRN
Status: DISCONTINUED | OUTPATIENT
Start: 2023-01-28 | End: 2023-01-29

## 2023-01-28 NOTE — PLAN OF CARE
Patient is A&Ox4. BP occasionally elevated, hospitalist aware. The rest of her vital signs wnl. Tolerating well on room air at rest and with ambulation with O2 sats >90%. Occasionally desats with cough, but recovers to >90% within a few seconds. She c/o SOB with prolonged activity. Tele, NSR/SB. Denies any pain, nausea, or diarrhea at this time. Good PO intake. Voiding. Ad shashi. Plan to ambulate in the rivera later today to evaluate oxygen needs. Receiving IV steroids. Updated on plan of care, no further questions at this time. Will continue to monitor.         Problem: Patient/Family Goals  Goal: Patient/Family Long Term Goal  Description: Patient's Long Term Goal: Discharge to home   Interventions:  - Follow plan of care   - See additional Care Plan goals for specific interventions  Outcome: Progressing  Goal: Patient/Family Short Term Goal  Description: Patient's Short Term Goal:   1/26 noc: reduce cough   1/26 noc: reduce cough   1/27 noc; sleep   1/27 AM: O2 walk, control cough  1/27 noc: manage cough  1/28 am: relieve SOB, ambulate in rivera    Interventions:   - Medications, call light,      - See additional Care Plan goals for specific interventions  Outcome: Progressing     Problem: RESPIRATORY - ADULT  Goal: Achieves optimal ventilation and oxygenation  Description: INTERVENTIONS:  - Assess for changes in respiratory status  - Assess for changes in mentation and behavior  - Position to facilitate oxygenation and minimize respiratory effort  - Oxygen supplementation based on oxygen saturation or ABGs  - Provide Smoking Cessation handout, if applicable  - Encourage broncho-pulmonary hygiene including cough, deep breathe, Incentive Spirometry  - Assess the need for suctioning and perform as needed  - Assess and instruct to report SOB or any respiratory difficulty  - Respiratory Therapy support as indicated  - Manage/alleviate anxiety  - Monitor for signs/symptoms of CO2 retention  Outcome: Progressing

## 2023-01-28 NOTE — PLAN OF CARE
Pt A&O X 4. Room air. Scheduled nebs. Solumedrol. NSR on Tele. Lovenox. PRN Cough meds. Regular diet. Up by self. No further needs at this time.     Problem: Patient/Family Goals  Goal: Patient/Family Long Term Goal  Description: Patient's Long Term Goal: Discharge to home   Interventions:  - Follow plan of care   - See additional Care Plan goals for specific interventions  Outcome: Progressing  Goal: Patient/Family Short Term Goal  Description: Patient's Short Term Goal:   1/26 noc: reduce cough   1/26 noc: reduce cough   1/27 noc; sleep   1/27 AM: O2 walk, control cough  1/27 noc: manage cough  Interventions:   - Medications, call light,      - See additional Care Plan goals for specific interventions  Outcome: Progressing     Problem: RESPIRATORY - ADULT  Goal: Achieves optimal ventilation and oxygenation  Description: INTERVENTIONS:  - Assess for changes in respiratory status  - Assess for changes in mentation and behavior  - Position to facilitate oxygenation and minimize respiratory effort  - Oxygen supplementation based on oxygen saturation or ABGs  - Provide Smoking Cessation handout, if applicable  - Encourage broncho-pulmonary hygiene including cough, deep breathe, Incentive Spirometry  - Assess the need for suctioning and perform as needed  - Assess and instruct to report SOB or any respiratory difficulty  - Respiratory Therapy support as indicated  - Manage/alleviate anxiety  - Monitor for signs/symptoms of CO2 retention  Outcome: Progressing

## 2023-01-29 VITALS
SYSTOLIC BLOOD PRESSURE: 154 MMHG | TEMPERATURE: 98 F | BODY MASS INDEX: 37 KG/M2 | DIASTOLIC BLOOD PRESSURE: 82 MMHG | WEIGHT: 210 LBS | HEART RATE: 59 BPM | OXYGEN SATURATION: 93 % | RESPIRATION RATE: 21 BRPM

## 2023-01-29 PROCEDURE — 94640 AIRWAY INHALATION TREATMENT: CPT

## 2023-01-29 RX ORDER — FLUTICASONE FUROATE AND VILANTEROL 100; 25 UG/1; UG/1
1 POWDER RESPIRATORY (INHALATION) DAILY
Qty: 1 EACH | Refills: 0 | Status: SHIPPED | OUTPATIENT
Start: 2023-01-30 | End: 2023-03-01

## 2023-01-29 RX ORDER — BENZONATATE 200 MG/1
200 CAPSULE ORAL 3 TIMES DAILY PRN
Qty: 21 CAPSULE | Refills: 0 | Status: SHIPPED | OUTPATIENT
Start: 2023-01-29 | End: 2023-02-05

## 2023-01-29 RX ORDER — PREDNISONE 10 MG/1
TABLET ORAL
Qty: 30 TABLET | Refills: 0 | Status: SHIPPED | OUTPATIENT
Start: 2023-01-29

## 2023-01-29 RX ORDER — GUAIFENESIN 600 MG/1
600 TABLET, EXTENDED RELEASE ORAL 2 TIMES DAILY
Qty: 30 TABLET | Refills: 0 | Status: SHIPPED | OUTPATIENT
Start: 2023-01-29

## 2023-01-29 NOTE — PLAN OF CARE
Pt A&O X 4. Room air. Scheduled nebs. Solumedrol. NSR; SB on Tele. BP elevated, MD aware. Lovenox. PRN Cough meds. Regular diet. Up by self. No further needs at this time.      Problem: Patient/Family Goals  Goal: Patient/Family Long Term Goal  Description: Patient's Long Term Goal: Discharge to home   Interventions:  - Follow plan of care   - See additional Care Plan goals for specific interventions  Outcome: Progressing  Goal: Patient/Family Short Term Goal  Description: Patient's Short Term Goal:   1/26 noc: reduce cough   1/26 noc: reduce cough   1/27 noc; sleep   1/27 AM: O2 walk, control cough  1/27 noc: manage cough  1/28 am: relieve SOB, ambulate in river[place]  1/28 noc: manage cough    Interventions:   - Medications, call light,      - See additional Care Plan goals for specific interventions  Outcome: Progressing     Problem: RESPIRATORY - ADULT  Goal: Achieves optimal ventilation and oxygenation  Description: INTERVENTIONS:  - Assess for changes in respiratory status  - Assess for changes in mentation and behavior  - Position to facilitate oxygenation and minimize respiratory effort  - Oxygen supplementation based on oxygen saturation or ABGs  - Provide Smoking Cessation handout, if applicable  - Encourage broncho-pulmonary hygiene including cough, deep breathe, Incentive Spirometry  - Assess the need for suctioning and perform as needed  - Assess and instruct to report SOB or any respiratory difficulty  - Respiratory Therapy support as indicated  - Manage/alleviate anxiety  - Monitor for signs/symptoms of CO2 retention  Outcome: Progressing

## 2023-01-29 NOTE — DISCHARGE INSTRUCTIONS
Continue with Prednisone taper (steroids) - 40 mg for 3 days, 30 mg for 3 days, 20 mg for 3 days, 10 mg for 3 days, then stop  Start taking Breo inhaler - you will need this for 2-3 months at least. Obtain refills from PCP / pulmonary  Continue cough suppressants at home  Keep a blood pressure log, bring this and BP machine to office visits

## 2023-01-29 NOTE — PLAN OF CARE
NURSING DISCHARGE NOTE    Discharged Home via Wheelchair. Accompanied by Support staff  Belongings Taken by patient/family. Patient is stable to discharge home. Medically cleared by pulm consult and hospitalist. Reviewed discharge instructions about medications and post-discharge follow up visits with patient and pt's  at bedside. Patient verbalized understanding. Questions and concerns addressed. IV line dc'ed, pressure and dressing applied. Clean/dry/intact. Discharge navigator completed. Tele box removed, cleaned, and returned to [facility]. All patient's belongings sent with patient.       Problem: Patient/Family Goals  Goal: Patient/Family Long Term Goal  Description: Patient's Long Term Goal: Discharge to home   Interventions:  - Follow plan of care   - See additional Care Plan goals for specific interventions  Outcome: Completed  Goal: Patient/Family Short Term Goal  Description: Patient's Short Term Goal:   1/26 noc: reduce cough   1/26 noc: reduce cough   1/27 noc; sleep   1/27 AM: O2 walk, control cough  1/27 noc: manage cough  1/28 am: relieve SOB, ambulate in [place]  1/28 noc: manage cough  1/29 am: discharge today    Interventions:   - Medications, call light,      - See additional Care Plan goals for specific interventions  Outcome: Completed     Problem: RESPIRATORY - ADULT  Goal: Achieves optimal ventilation and oxygenation  Description: INTERVENTIONS:  - Assess for changes in respiratory status  - Assess for changes in mentation and behavior  - Position to facilitate oxygenation and minimize respiratory effort  - Oxygen supplementation based on oxygen saturation or ABGs  - Provide Smoking Cessation handout, if applicable  - Encourage broncho-pulmonary hygiene including cough, deep breathe, Incentive Spirometry  - Assess the need for suctioning and perform as needed  - Assess and instruct to report SOB or any respiratory difficulty  - Respiratory Therapy support as indicated  - Manage/alleviate anxiety  - Monitor for signs/symptoms of CO2 retention  Outcome: Completed

## 2023-02-27 ENCOUNTER — APPOINTMENT (OUTPATIENT)
Dept: GENERAL RADIOLOGY | Age: 48
End: 2023-02-27
Attending: EMERGENCY MEDICINE
Payer: COMMERCIAL

## 2023-02-27 ENCOUNTER — HOSPITAL ENCOUNTER (EMERGENCY)
Age: 48
Discharge: HOME | End: 2023-02-27
Attending: EMERGENCY MEDICINE
Payer: COMMERCIAL

## 2023-02-27 ENCOUNTER — HOSPITAL ENCOUNTER (EMERGENCY)
Facility: HOSPITAL | Age: 48
Discharge: LEFT BEFORE DISCHARGE | End: 2023-02-27
Payer: COMMERCIAL

## 2023-02-27 VITALS
SYSTOLIC BLOOD PRESSURE: 141 MMHG | RESPIRATION RATE: 20 BRPM | WEIGHT: 212 LBS | BODY MASS INDEX: 37.56 KG/M2 | OXYGEN SATURATION: 98 % | HEIGHT: 63 IN | HEART RATE: 94 BPM | TEMPERATURE: 99 F | DIASTOLIC BLOOD PRESSURE: 96 MMHG

## 2023-02-27 DIAGNOSIS — R06.00 DYSPNEA, UNSPECIFIED TYPE: Primary | ICD-10-CM

## 2023-02-27 LAB
ALBUMIN SERPL-MCNC: 3.7 G/DL (ref 3.4–5)
ALBUMIN/GLOB SERPL: 1 {RATIO} (ref 1–2)
ALP LIVER SERPL-CCNC: 68 U/L
ALT SERPL-CCNC: 48 U/L
ANION GAP SERPL CALC-SCNC: 6 MMOL/L (ref 0–18)
AST SERPL-CCNC: 17 U/L (ref 15–37)
BASOPHILS # BLD AUTO: 0.01 X10(3) UL (ref 0–0.2)
BASOPHILS NFR BLD AUTO: 0.1 %
BILIRUB SERPL-MCNC: 0.5 MG/DL (ref 0.1–2)
BUN BLD-MCNC: 13 MG/DL (ref 7–18)
CALCIUM BLD-MCNC: 9.1 MG/DL (ref 8.5–10.1)
CHLORIDE SERPL-SCNC: 105 MMOL/L (ref 98–112)
CO2 SERPL-SCNC: 27 MMOL/L (ref 21–32)
CREAT BLD-MCNC: 0.82 MG/DL
D DIMER PPP FEU-MCNC: 0.31 UG/ML FEU (ref ?–0.5)
EOSINOPHIL # BLD AUTO: 0.1 X10(3) UL (ref 0–0.7)
EOSINOPHIL NFR BLD AUTO: 1 %
ERYTHROCYTE [DISTWIDTH] IN BLOOD BY AUTOMATED COUNT: 14.2 %
GFR SERPLBLD BASED ON 1.73 SQ M-ARVRAT: 88 ML/MIN/1.73M2 (ref 60–?)
GLOBULIN PLAS-MCNC: 3.6 G/DL (ref 2.8–4.4)
GLUCOSE BLD-MCNC: 101 MG/DL (ref 70–99)
HCT VFR BLD AUTO: 40.4 %
HGB BLD-MCNC: 13.4 G/DL
IMM GRANULOCYTES # BLD AUTO: 0.1 X10(3) UL (ref 0–1)
IMM GRANULOCYTES NFR BLD: 1 %
LYMPHOCYTES # BLD AUTO: 2.7 X10(3) UL (ref 1–4)
LYMPHOCYTES NFR BLD AUTO: 27.1 %
MCH RBC QN AUTO: 28.4 PG (ref 26–34)
MCHC RBC AUTO-ENTMCNC: 33.2 G/DL (ref 31–37)
MCV RBC AUTO: 85.6 FL
MONOCYTES # BLD AUTO: 0.57 X10(3) UL (ref 0.1–1)
MONOCYTES NFR BLD AUTO: 5.7 %
NEUTROPHILS # BLD AUTO: 6.49 X10 (3) UL (ref 1.5–7.7)
NEUTROPHILS # BLD AUTO: 6.49 X10(3) UL (ref 1.5–7.7)
NEUTROPHILS NFR BLD AUTO: 65.1 %
NT-PROBNP SERPL-MCNC: 16 PG/ML (ref ?–125)
OSMOLALITY SERPL CALC.SUM OF ELEC: 286 MOSM/KG (ref 275–295)
PLATELET # BLD AUTO: 254 10(3)UL (ref 150–450)
POTASSIUM SERPL-SCNC: 3.9 MMOL/L (ref 3.5–5.1)
PROT SERPL-MCNC: 7.3 G/DL (ref 6.4–8.2)
RBC # BLD AUTO: 4.72 X10(6)UL
SARS-COV-2 RNA RESP QL NAA+PROBE: NOT DETECTED
SODIUM SERPL-SCNC: 138 MMOL/L (ref 136–145)
TROPONIN I HIGH SENSITIVITY: 12 NG/L
WBC # BLD AUTO: 10 X10(3) UL (ref 4–11)

## 2023-02-27 PROCEDURE — 83880 ASSAY OF NATRIURETIC PEPTIDE: CPT | Performed by: EMERGENCY MEDICINE

## 2023-02-27 PROCEDURE — 71045 X-RAY EXAM CHEST 1 VIEW: CPT | Performed by: EMERGENCY MEDICINE

## 2023-02-27 PROCEDURE — 93005 ELECTROCARDIOGRAM TRACING: CPT

## 2023-02-27 PROCEDURE — 93010 ELECTROCARDIOGRAM REPORT: CPT

## 2023-02-27 PROCEDURE — 80053 COMPREHEN METABOLIC PANEL: CPT | Performed by: EMERGENCY MEDICINE

## 2023-02-27 PROCEDURE — 36415 COLL VENOUS BLD VENIPUNCTURE: CPT

## 2023-02-27 PROCEDURE — 84484 ASSAY OF TROPONIN QUANT: CPT | Performed by: EMERGENCY MEDICINE

## 2023-02-27 PROCEDURE — 99285 EMERGENCY DEPT VISIT HI MDM: CPT

## 2023-02-27 PROCEDURE — 85379 FIBRIN DEGRADATION QUANT: CPT | Performed by: EMERGENCY MEDICINE

## 2023-02-27 PROCEDURE — 99284 EMERGENCY DEPT VISIT MOD MDM: CPT

## 2023-02-27 PROCEDURE — 85025 COMPLETE CBC W/AUTO DIFF WBC: CPT | Performed by: EMERGENCY MEDICINE

## 2023-02-27 RX ORDER — FLUTICASONE FUROATE, UMECLIDINIUM BROMIDE AND VILANTEROL TRIFENATATE 200; 62.5; 25 UG/1; UG/1; UG/1
1 POWDER RESPIRATORY (INHALATION) DAILY
Qty: 1 EACH | Refills: 1 | Status: SHIPPED | OUTPATIENT
Start: 2023-02-27 | End: 2023-03-29

## 2023-02-27 RX ORDER — PREDNISONE 20 MG/1
40 TABLET ORAL DAILY
Qty: 10 TABLET | Refills: 0 | Status: SHIPPED | OUTPATIENT
Start: 2023-02-27 | End: 2023-03-04

## 2023-02-27 NOTE — ED INITIAL ASSESSMENT (HPI)
C/o difficulty of breathing started since last week. Was admitted to the hospital last month due asthma and Bronchitis. Completed prednisone and antibiotic.  Was sent by her pcp for further eval.

## 2023-02-28 LAB
ATRIAL RATE: 76 BPM
P AXIS: 35 DEGREES
P-R INTERVAL: 172 MS
Q-T INTERVAL: 364 MS
QRS DURATION: 98 MS
QTC CALCULATION (BEZET): 409 MS
R AXIS: 8 DEGREES
T AXIS: 1 DEGREES
VENTRICULAR RATE: 76 BPM

## 2023-03-06 DIAGNOSIS — R06.09 DYSPNEA ON EXERTION: Primary | ICD-10-CM

## 2023-05-23 ENCOUNTER — OFFICE VISIT (OUTPATIENT)
Facility: LOCATION | Age: 48
End: 2023-05-23
Payer: COMMERCIAL

## 2023-05-23 DIAGNOSIS — H92.01 RIGHT EAR PAIN: Primary | ICD-10-CM

## 2023-05-23 DIAGNOSIS — H92.01 OTALGIA, RIGHT: Primary | ICD-10-CM

## 2023-05-23 PROCEDURE — 31575 DIAGNOSTIC LARYNGOSCOPY: CPT | Performed by: OTOLARYNGOLOGY

## 2023-05-23 PROCEDURE — 92553 AUDIOMETRY AIR & BONE: CPT | Performed by: AUDIOLOGIST

## 2023-05-23 PROCEDURE — 99204 OFFICE O/P NEW MOD 45 MIN: CPT | Performed by: OTOLARYNGOLOGY

## 2023-05-23 PROCEDURE — 92567 TYMPANOMETRY: CPT | Performed by: AUDIOLOGIST

## 2023-08-23 ENCOUNTER — OFFICE VISIT (OUTPATIENT)
Dept: NEUROLOGY | Facility: CLINIC | Age: 48
End: 2023-08-23
Payer: COMMERCIAL

## 2023-08-23 VITALS
DIASTOLIC BLOOD PRESSURE: 70 MMHG | SYSTOLIC BLOOD PRESSURE: 122 MMHG | HEART RATE: 71 BPM | WEIGHT: 213 LBS | BODY MASS INDEX: 38 KG/M2 | RESPIRATION RATE: 16 BRPM

## 2023-08-23 DIAGNOSIS — M54.31 SCIATICA OF RIGHT SIDE: ICD-10-CM

## 2023-08-23 DIAGNOSIS — G93.2 PSEUDOTUMOR CEREBRI: Primary | ICD-10-CM

## 2023-08-23 DIAGNOSIS — G43.719 INTRACTABLE CHRONIC MIGRAINE WITHOUT AURA AND WITHOUT STATUS MIGRAINOSUS: ICD-10-CM

## 2023-08-23 PROCEDURE — 3078F DIAST BP <80 MM HG: CPT | Performed by: OTHER

## 2023-08-23 PROCEDURE — 99244 OFF/OP CNSLTJ NEW/EST MOD 40: CPT | Performed by: OTHER

## 2023-08-23 PROCEDURE — 3074F SYST BP LT 130 MM HG: CPT | Performed by: OTHER

## 2023-08-23 RX ORDER — TOPIRAMATE 50 MG/1
TABLET, FILM COATED ORAL
Qty: 90 TABLET | Refills: 3 | Status: SHIPPED | OUTPATIENT
Start: 2023-08-23

## 2023-08-23 RX ORDER — TRIAMTERENE AND HYDROCHLOROTHIAZIDE 37.5; 25 MG/1; MG/1
1 TABLET ORAL DAILY
COMMUNITY
Start: 2023-07-25

## 2023-08-23 NOTE — PROGRESS NOTES
Pt states since January has had headaches and migraines. Pt states feeling a lot of pressure. Pt states having lower back pain. Pt states having numbness in left leg. BUE have numbness and tingling.

## 2023-12-07 ENCOUNTER — HOSPITAL ENCOUNTER (OUTPATIENT)
Dept: MRI IMAGING | Age: 48
Discharge: HOME | End: 2023-12-07
Attending: Other
Payer: COMMERCIAL

## 2023-12-07 DIAGNOSIS — G93.2 PSEUDOTUMOR CEREBRI: ICD-10-CM

## 2023-12-07 DIAGNOSIS — G43.719 INTRACTABLE CHRONIC MIGRAINE WITHOUT AURA AND WITHOUT STATUS MIGRAINOSUS: ICD-10-CM

## 2023-12-07 PROCEDURE — 70551 MRI BRAIN STEM W/O DYE: CPT | Performed by: OTHER

## 2023-12-15 ENCOUNTER — OFFICE VISIT (OUTPATIENT)
Dept: NEUROLOGY | Facility: CLINIC | Age: 48
End: 2023-12-15
Payer: COMMERCIAL

## 2023-12-15 VITALS
SYSTOLIC BLOOD PRESSURE: 120 MMHG | RESPIRATION RATE: 16 BRPM | BODY MASS INDEX: 37 KG/M2 | DIASTOLIC BLOOD PRESSURE: 74 MMHG | WEIGHT: 211 LBS | HEART RATE: 77 BPM

## 2023-12-15 DIAGNOSIS — G93.2 PSEUDOTUMOR CEREBRI SYNDROME: Primary | ICD-10-CM

## 2023-12-15 PROCEDURE — 99215 OFFICE O/P EST HI 40 MIN: CPT | Performed by: OTHER

## 2023-12-15 PROCEDURE — 3074F SYST BP LT 130 MM HG: CPT | Performed by: OTHER

## 2023-12-15 PROCEDURE — 3078F DIAST BP <80 MM HG: CPT | Performed by: OTHER

## 2023-12-15 RX ORDER — LORAZEPAM 1 MG/1
1 TABLET ORAL AS NEEDED
Qty: 2 TABLET | Refills: 0 | Status: SHIPPED | OUTPATIENT
Start: 2023-12-15

## 2023-12-15 NOTE — PROGRESS NOTES
Pt states since January has had headaches and migraines. Pt states feeling a lot of pressure. Pt states having lower back pain. Pt states having numbness in left leg. BUE have numbness and tingling. pt states has become painful

## 2025-03-06 DIAGNOSIS — M62.81 MUSCLE WEAKNESS OF LOWER EXTREMITY: ICD-10-CM

## 2025-03-06 DIAGNOSIS — R20.2 PARESTHESIA: Primary | ICD-10-CM

## 2025-03-27 ENCOUNTER — TELEPHONE (OUTPATIENT)
Dept: OBGYN CLINIC | Facility: CLINIC | Age: 50
End: 2025-03-27

## 2025-03-27 ENCOUNTER — OFFICE VISIT (OUTPATIENT)
Dept: OBGYN CLINIC | Facility: CLINIC | Age: 50
End: 2025-03-27
Payer: COMMERCIAL

## 2025-03-27 VITALS — BODY MASS INDEX: 37.92 KG/M2 | WEIGHT: 214 LBS | HEIGHT: 63 IN

## 2025-03-27 DIAGNOSIS — Z13.6 SCREENING FOR CARDIOVASCULAR CONDITION: ICD-10-CM

## 2025-03-27 DIAGNOSIS — Z12.4 ENCOUNTER FOR PAPANICOLAOU SMEAR FOR CERVICAL CANCER SCREENING: ICD-10-CM

## 2025-03-27 DIAGNOSIS — Z01.419 ENCOUNTER FOR WELL WOMAN EXAM WITH ROUTINE GYNECOLOGICAL EXAM: Primary | ICD-10-CM

## 2025-03-27 DIAGNOSIS — R93.89 THICKENED ENDOMETRIUM: ICD-10-CM

## 2025-03-27 DIAGNOSIS — N84.0 ENDOMETRIAL POLYP: Primary | ICD-10-CM

## 2025-03-27 DIAGNOSIS — Z12.31 ENCOUNTER FOR SCREENING MAMMOGRAM FOR BREAST CANCER: ICD-10-CM

## 2025-03-27 DIAGNOSIS — N84.0 ENDOMETRIAL POLYP: ICD-10-CM

## 2025-03-27 PROCEDURE — 88305 TISSUE EXAM BY PATHOLOGIST: CPT | Performed by: OBSTETRICS & GYNECOLOGY

## 2025-03-27 PROCEDURE — 87625 HPV TYPES 16 & 18 ONLY: CPT | Performed by: OBSTETRICS & GYNECOLOGY

## 2025-03-27 PROCEDURE — 87624 HPV HI-RISK TYP POOLED RSLT: CPT | Performed by: OBSTETRICS & GYNECOLOGY

## 2025-03-27 RX ORDER — ESTRADIOL 0.1 MG/G
CREAM VAGINAL
Qty: 42.5 G | Refills: 3 | Status: SHIPPED | OUTPATIENT
Start: 2025-03-27

## 2025-03-27 RX ORDER — PROGESTERONE 100 MG/1
CAPSULE ORAL
Qty: 40 CAPSULE | Refills: 1 | Status: SHIPPED | OUTPATIENT
Start: 2025-03-27

## 2025-03-27 RX ORDER — ESTRADIOL 0.07 MG/D
1 FILM, EXTENDED RELEASE TRANSDERMAL
Qty: 24 PATCH | Refills: 4 | Status: SHIPPED | OUTPATIENT
Start: 2025-03-27

## 2025-03-27 NOTE — TELEPHONE ENCOUNTER
SURGERY:  schedule hysteroscopy with polypectomy (considering ablation)     DATE REQUESTED: OR Days     Hosp Stay: Out Pt     Major/Minor: Minor     Anticipated time: 20 min     Anesthesia:  MAC     ASSIST NEEDED:  no     PRE-OP WITH PCP: no     DX:  abn bleeding due to endometrial polyp       Dorie Hernández MD     2019       RE: Sae Yip  [address] [facility] [address]     Dear Colleague,    Thank you for referring your patient, Sae Yip, to the [facility]. Please see a copy of my visit note below.    2019    Referring Provider: Self-referred    Presenting Information:   I met with Sae Yip today for genetic counseling at the Cancer Risk Management Program at the [facility] to discuss his family history of mesothelioma and a BAP1 mutation, as well as breast cancer.  He is here today with his wife Laura to review this history, cancer screening recommendations, and available genetic testing options.    Personal History:  Sae is a 37 year old male. He does not have any personal history of cancer.       Sae has not had a colonoscopy. Other than dermatological exams, he does not regularly do any other cancer screening at this time. Sae reported no tobacco use and no alcohol use.    Family History: (Please see scanned pedigree for detailed family history information)    His mother had genetic testing via [employer] in 2019 and tested positive for a mutation in BAP1. Specifically, her mutation is called [id number] (p.[id number]). A copy of her report was available today.    His maternal aunt had breast cancer at 51; she is currently 66. By report, she is having genetic testing done now.     His maternal grandfather had mesothelioma around 68 and he passed away at 70. He had two younger sisters (Sae's great-aunts) who  of cancer but the type was unknown. His grandfather's father also passed away from cancer, possibly mesothelioma.     There is no known cancer history on his paternal side of the family.    His maternal ethnicity is English. His paternal ethnicity is Citizen of [place].  There is no known Ashkenazi Quaker ancestry on either side of his family. There is no reported consanguinity.    Discussion:    We reviewed the features of  sporadic, familial, and hereditary cancers. For his family, a BAP1 mutation is associated with a diagnosis of BAP1 Tumor Predisposition Syndrome.  As this is a rare syndrome, there are currently no lifetime cancer risk estimates for the BAP1-associated cancers. There is research, though, that has identified how many individuals with a known BAP1 gene mutation develop one of the associated cancers or features. This data is based on under 200 individuals in the medical literature. As new information becomes available, more concrete lifetime cancer risks may also become available. We reviewed that his maternal grandfather and great-grandfather's mesothelioma may have been caused by the BAP1 mutation found in his mother, but without confirmation we cannot be sure.    We discussed that Sae's chance that he inherited his mother's BAP1 mutation is 50%. He asked about his daughters and we reviewed that if he is positive, each daughter has a 50/50 chance to inherit the same BAP1 mutation.     Sae brought in information from [employer] about BAP1 Tumor Predisposition Syndrome. We reviewed the association with this syndrome and atypical Spitz tumors, uveal melanoma, mesothelioma, cutaneous melanoma, kidney cancer and the risk for other cancers such as breast, thyroid, meningioma, and neuroendocrine tumors. Currently there are no estimates regarding the prevalence or lifetime risk of these cancers.    Sae and Laura had questions about testing for their daughters and screening. Because screening for uveal melanoma typically begins at 11, genetic testing for children can occur at 11. We discussed that additional screening recommendations at [facility] are dermatological exams for cutaneous melanoma that start at age 20, and that kidney cancer screening (abdominal ultrasounds and urine analysis) typically starts at age 20. Annual physicals are recommended starting at age 11 or age of diagnosis.  Annual abdominal ultrasound starting at 20 are recommended, along with alternating chest and abdominal MRI imaging peritoneum, pleura and kidneys.   Based on his aunt's breast cancer at 51, we briefly discussed Hereditary Breast and Ovarian Cancer (HBOC), which is caused by a mutation in the BRCA1 or BRCA2 gene.  HBOC typically presents with multiple family members diagnosed with breast cancer before age 50 and/or ovarian cancer. Other cancer risks associated with HBOC include male breast cancer, prostate cancer, pancreatic cancer, and melanoma.     A handout regarding BAP1 and uveal melanoma from the National Comprehensive Cancer Network (NCCN) guidelines was provided to Sae at the end of our appointment today.     We discussed that Sae could opt for single-site analysis of the BAP1 gene or decide to have an expanded panel, as there are additional genes that could cause increased risk for breast cancer. As many of these genes present with overlapping features in a family and accurate cancer risk cannot always be established based upon the pedigree analysis alone, it would be reasonable for Sae to consider panel genetic testing to analyze multiple genes at once.    Sae opted to not proceed with genetic testing today as he needed to leave for work, as well as talk with his mother to see if she had an expanded panel of genes tested or just the BAP1. We discussed that if she had an expanded panel and was negative, Sae would likely just need BAP1 testing, given his father's side has no known cancers.     He requested a return visit at [facility] at which time he would sign consent forms and have a blood draw. He mentioned that [employer] has a free family testing program until January 13 based on his mother's test and asked if I could try to schedule a return before that date. I verbalized that I would do my best.     Medical Management: For Sae, we reviewed that the information  from future genetic testing may determine:    additional cancer screening for which Sae may qualify (i.e., more frequent dermatologic exams, eye exams, kidney screening exams, etc.),    and targeted chemotherapies for Sae if he were to develop certain cancers in the future (i.e. immunotherapy for individuals with Jerry syndrome, PARP inhibitors, etc.).  This would apply if Sae did expanded testing and were positive for a cancer susceptibility mutation for another syndrome.     These recommendations and possible targeted chemotherapies will be discussed in detail once genetic testing is completed.     Plan:  1) Today Sae elected to wait to have genetic testing.  2) He plans to return to [facility] before January 13, 2020, to discuss BAP1 and possibly expanded testing.   3) Per our discussion, I also called him today and left a voicemail about the screening for BAP1 Tumor Predisposition Syndrome done at [facility] mentioned above.     Face to face time: 40 minutes    Abby German MS, [facility]  Licensed genetic counselor  [phone]    Again, thank you for allowing me to participate in the care of your patient.      Sincerely,    Samia German, GC

## 2025-03-27 NOTE — PROGRESS NOTES
[facility]  Obstetrics and Gynecology  Gynecology Visit    Chief Complaint   Patient presents with    Consult           Paige Freire is a 49 year old female who presents for consult/annual exam.    LMP: 2025.    Menses regular: n/a.    Menstrual flow normal: n/a.    Birth control or HRT:  0.   Refill 0  Last Pap Smear: .  Any history of abnormal paps: No hx abn   Last MMG: up to date  Any Medication Refills needed today?: no  Sleep: 7-8 hours.    Diet: balanced.    Exercise: no.   Screening labs/Blood work today: no.     Colonoscopy (if over 44 y/o): no.   Gardasil:(age 9-44 y/o) no.   Genetic Cancer screen (if indicated): no.   Flu (Aug-April): patient declined.TDAP (every 10 years) up to date.      Additional Problems/concerns: Patient would like to discuss abnormal ultrasound.      Next Appt:     Immunization History   Administered Date(s) Administered    Covid-19 Vaccine Pfizer 30 mcg/0.3 ml 2021    TDAP 2015         Current Outpatient Medications:     Triamterene-HCTZ 37.5-25 MG Oral Tab, Take 1 tablet by mouth daily., Disp: , Rfl:     albuterol (PROAIR HFA) 108 (90 Base) MCG/ACT Inhalation Aero Soln, 2 puffs every 4-6 hours prn wheezing or shortness of breath, Disp: 1 each, Rfl: 0    Magnesium Gluconate 500 MG Oral Tab, Take 0.8 tablets (400 mg total) by mouth 2 (two) times daily., Disp: , Rfl:     Melatonin 10 MG Oral Tab, Take 1 tablet by mouth nightly as needed.  , Disp: , Rfl:     ZYRTEC 10 MG OR TABS, Take 1 tablet (10 mg total) by mouth daily., Disp: , Rfl:     Allergies[1]    OB History    Para Term  AB Living   2 2 1 1 0 2   SAB IAB Ectopic Multiple Live Births   0 0 0 0 2      # Outcome Date GA Lbr Delfino/2nd Weight Sex Type Anes PTL Lv   2 Term 07 37w0d  8 lb M Vag-Spont  N CECILIA   1  04 34w0d  6 lb F Vag-Spont  Y CECILIA       Gyn History       No data recorded      Latest Ref Rng & Units 2019    11:55 AM   RECENT PAP RESULTS    INTERPRETATION/RESULT: Negative for intraepithelial lesion or malignancy, Negative for intraepithelial lesion or malignancy-Reactive/Other changes, Unsatisfactory for Evaluation Negative for intraepithelial lesion or malignancy    HPV Negative Negative            Past Medical History:    Allergic rhinitis    Amenorrhea    Had break through bleeding feb 25    Asthma (HCC)    Diabetes mellitus (HCC)    gestational    Endometriosis    Ultrasound said    Esophageal reflux    Fibroids    Ultrasound said i have. Also been told 20+ years ago    Fibromyalgia    High blood pressure    Hyperlipidemia    Infectious disease    Lymes    Lupus    Lyme disease    Migraine headache with aura    Migraine headache without aura    Muscle weakness    r/t lupus and fibromyalgia    OTHER DISEASES    LYME DISEASE    Personal history of other diseases of respiratory system    asthma    SEASONAL ALLERGIES    Unspecified essential hypertension    Visual impairment    glasses       Past Surgical History:   Procedure Laterality Date    Create eardrum opening,gen anesth      Cryocautery of cervix  I was around 21    D & c  2014    Other surgical history  06/01/2003    NOSE SURGERY       Family History   Problem Relation Age of Onset    Hypertension Father     Lipids Father     Allergies Father     Prostate Cancer Father     Heart Disorder Mother     Hypertension Mother     Lipids Mother     Allergies Mother     Thyroid disease Mother         hypo thyroid cyst or lump    Asthma Mother     Arthritis Mother     Other (Other) Mother         osteoarthritis    Heart Disorder Maternal Grandmother     Hypertension Maternal Grandmother     Lipids Maternal Grandmother     Obesity Maternal Grandmother     Arthritis Maternal Grandmother     Heart Attack Maternal Grandmother     Heart Disease Maternal Grandmother     Stroke Maternal Grandmother     Psychiatric Maternal Grandfather     Heart Disorder Maternal Grandfather     Ear Problems Maternal  Grandfather         mastoid    Cancer Paternal Grandmother         brain    Diabetes Paternal Grandmother     Cancer Paternal Grandfather         colon        Tobacco  Allergies  Meds  Soc Hx        Social History     Socioeconomic History    Marital status:      Spouse name: Not on file    Number of children: Not on file    Years of education: Not on file    Highest education level: Not on file   Occupational History    Not on file   Tobacco Use    Smoking status: Never     Passive exposure: Never    Smokeless tobacco: Never   Vaping Use    Vaping status: Never Used   Substance and Sexual Activity    Alcohol use: Not Currently    Drug use: No    Sexual activity: Yes     Partners: Male     Birth control/protection: Vasectomy   Other Topics Concern    Caffeine Concern No    Exercise No    Seat Belt Yes    Special Diet No    Stress Concern No    Weight Concern Yes   Social History Narrative    Not on file     Social Drivers of Health     Food Insecurity: Not on file   Transportation Needs: Not on file   Stress: Not on file   Housing Stability: Not on file     Ht 5' 3\" (1.6 m)   Wt 214 lb (97.1 kg)     Wt Readings from Last 3 Encounters:   03/27/25 214 lb (97.1 kg)   12/15/23 211 lb (95.7 kg)   08/23/23 213 lb (96.6 kg)       Health Maintenance   Topic Date Due    Annual Physical  Never done    Colorectal Cancer Screening  Never done    Asthma Control Test  Never done    Pneumococcal Vaccine: 50+ Years (1 of 2 - PCV) Never done    Mammogram  06/04/2020    Zoster Vaccines (1 of 2) Never done    Pap Smear  05/23/2024    COVID-19 Vaccine (2 - 2024-25 season) 09/01/2024    Influenza Vaccine (1) Never done    DTaP,Tdap,and Td Vaccines (2 - Td or Tdap) 02/23/2025    Annual Depression Screening  Completed    Meningococcal B Vaccine  Aged Out         Review of Systems   General: Present- Feeling well. Not Present- Fever.  Female Genitourinary: Not Present- Dysmenorrhea, Dyspareunia, Flank Pain, Frequency, Menstrual  Irregularities, Pelvic Pain, Urgency, Urinary Complaints, Vaginal Bleeding and Vaginal dryness.  Pain: Present- Pain Rating - 0 on a 0-10 scale.  All other systems negative       Physical Exam   The physical exam findings are as follows:     Abdomen   Inspection: - Inspection Normal.  Palpation/Percussion: Palpation and Percussion of the abdomen reveal - Non Tender, No hepatosplenomegaly and No Palpable abdominal masses.    Female Genitourinary     External Genitalia   Perineum - Normal. Bartholin's Gland - Bilateral - Normal. Clitoris - Normal.  Introitus: Characteristics - Normal. Discharge - None.  Labia Majora: Lesions - Bilateral - None. Characteristics - Bilateral - Normal.  Labia Minora: Lesions - Bilateral - None. Characteristics - Bilateral - Normal.  Urethra: Characteristics - Normal. Discharge - None.  [place] Gland - Bilateral - Normal.  Vulva: Characteristics - Normal. Lesions - None.    Speculum & Bimanual   Vagina:   Vaginal Wall: - Normal.  Vaginal Lesions - None. Vaginal Mucosa - Normal.  Cervix: Characteristics - No Motion tenderness. Discharge - None.  Uterus: Characteristics - Non Tender. Position - Midposition.  Adnexa: Characteristics - Bilateral - Tender. Masses - No Adnexal Masses.  Bladder - Normal.    Rectal   Anorectal Exam: External - normal external exam.    Lymphatic  General Lymphatics   Description - Normal .    Endometrial Biopsy    Risks, benefits, alternatives, and indications of procedure reviewed with patient.  The patient voices clear understanding and desires to proceed.  Consent was signed and witnessed by assistant.    Betadine prep of the cervix was done.  The anterior lip of the cervix was grasped with a single-tooth tenaculum.  The pipelle was placed into the os of the cervix without difficulty.  The Pipelle was passed  without difficulty.  No bleeding was noted afterward.      Patient tolerated procedure well.  Specimen sent to Pathology for evaluation.  Will call patient  with results.           Location: Transabdominal   Transvaginal x    Gyn Data: Uterine Size  wnls       Uterine Lining thickened with polyps - embx done at time       Uterus wnls Y/N y      Adnexa wnls Y/N n left ovarian cyst  2x2.5 cm       Sonohysterography Uterine polyps.    Impression: schedule hysteroscopy with polypectomy     Ultrasound from [facility] reviewed as part of visit.(Scanned) .     Reviewed options for treatment for menopause including diet, exercise, weight loss, stress reduction, environmental and medication.  Patient to try estrogen with progesterone quarterly and return to the office in 3 months to review effectiveness of plan. Patient was provided with informed consent for medication including a review of the proposed medication and all effective possibilities.  A discussion of the risks of the medication, benefits, side effects, and success were addressed.  Alternative treatments were discussed as well.     Oral estrogens should be avoided in women with hypertriglyceridemia, active gallbladder disease, or known thrombophilias such as factor V Leiden (with or without a personal history of venous thromboembolism [VTE]). Transdermal estrogen is also preferred for women with migraine headaches with auras. However, the baseline risk of both VTE and stroke is very low in otherwise healthy, young, postmenopausal women.      All questions were answered.  Patient is to proceed with Medication.       1. Encounter for well woman exam with routine gynecological exam    2. Encounter for Papanicolaou smear for cervical cancer screening    3. Encounter for screening mammogram for breast cancer    4. Screening for cardiovascular condition    5. Thickened endometrium    6. Endometrial polyp                         [1]   Allergies  Allergen Reactions    Zolmitriptan HIVES, ITCHING and JITTERY    Biaxin Oral Suspension [Clarithromycin] NAUSEA AND VOMITING    Contrast Dye [Gadolinium Derivatives]  NAUSEA AND VOMITING     Patient states this was during a ct scan, not during her MRI -has had MRI with contrast w/o issues    Pcns [Penicillins]      NOT SURE OF REACTION HAD REACTION AS CHILD AND NEVER TAKEN MED SINCE    Shellfish     Sulfa Antibiotics UNKNOWN

## 2025-03-28 NOTE — TELEPHONE ENCOUNTER
I spoke with the patient, confirmed date and time for her procedure. I also sent a minor surgical case letter via [employer]     Surgical case request has been sent    I will process prior authorization closer to date

## 2025-03-31 LAB — HPV E6+E7 MRNA CVX QL NAA+PROBE: POSITIVE

## 2025-04-01 LAB
HPV16 DNA CVX QL PROBE+SIG AMP: NEGATIVE
HPV18 DNA CVX QL PROBE+SIG AMP: NEGATIVE

## 2025-04-04 ENCOUNTER — PATIENT MESSAGE (OUTPATIENT)
Dept: OBGYN CLINIC | Facility: CLINIC | Age: 50
End: 2025-04-04

## 2025-05-13 ENCOUNTER — TELEPHONE (OUTPATIENT)
Dept: OBGYN CLINIC | Facility: CLINIC | Age: 50
End: 2025-05-13

## 2025-05-19 ENCOUNTER — ANESTHESIA (OUTPATIENT)
Dept: SURGERY | Facility: HOSPITAL | Age: 50
End: 2025-05-19
Payer: COMMERCIAL

## 2025-05-19 ENCOUNTER — ANESTHESIA EVENT (OUTPATIENT)
Dept: SURGERY | Facility: HOSPITAL | Age: 50
End: 2025-05-19
Payer: COMMERCIAL

## 2025-05-19 ENCOUNTER — HOSPITAL ENCOUNTER (OUTPATIENT)
Facility: HOSPITAL | Age: 50
Setting detail: HOSPITAL OUTPATIENT SURGERY
Discharge: HOME | End: 2025-05-19
Attending: OBSTETRICS & GYNECOLOGY | Admitting: OBSTETRICS & GYNECOLOGY
Payer: COMMERCIAL

## 2025-05-19 ENCOUNTER — TELEPHONE (OUTPATIENT)
Dept: OBGYN CLINIC | Facility: CLINIC | Age: 50
End: 2025-05-19

## 2025-05-19 VITALS
DIASTOLIC BLOOD PRESSURE: 76 MMHG | RESPIRATION RATE: 18 BRPM | WEIGHT: 215 LBS | BODY MASS INDEX: 38.09 KG/M2 | TEMPERATURE: 98 F | SYSTOLIC BLOOD PRESSURE: 155 MMHG | OXYGEN SATURATION: 96 % | HEIGHT: 63 IN | HEART RATE: 51 BPM

## 2025-05-19 DIAGNOSIS — N84.0 ENDOMETRIAL POLYP: ICD-10-CM

## 2025-05-19 LAB
B-HCG UR QL: NEGATIVE
GLUCOSE BLDC GLUCOMTR-MCNC: 99 MG/DL (ref 70–99)

## 2025-05-19 PROCEDURE — 58558 HYSTEROSCOPY BIOPSY: CPT | Performed by: OBSTETRICS & GYNECOLOGY

## 2025-05-19 RX ORDER — MORPHINE SULFATE 10 MG/ML
6 INJECTION, SOLUTION INTRAMUSCULAR; INTRAVENOUS EVERY 10 MIN PRN
Status: DISCONTINUED | OUTPATIENT
Start: 2025-05-19 | End: 2025-05-19

## 2025-05-19 RX ORDER — FAMOTIDINE 20 MG/1
20 TABLET, FILM COATED ORAL ONCE
Status: COMPLETED | OUTPATIENT
Start: 2025-05-19 | End: 2025-05-19

## 2025-05-19 RX ORDER — METOCLOPRAMIDE 10 MG/1
10 TABLET ORAL ONCE
Status: COMPLETED | OUTPATIENT
Start: 2025-05-19 | End: 2025-05-19

## 2025-05-19 RX ORDER — MIDAZOLAM HYDROCHLORIDE 1 MG/ML
INJECTION INTRAMUSCULAR; INTRAVENOUS AS NEEDED
Status: DISCONTINUED | OUTPATIENT
Start: 2025-05-19 | End: 2025-05-19 | Stop reason: SURG

## 2025-05-19 RX ORDER — SODIUM CHLORIDE, SODIUM LACTATE, POTASSIUM CHLORIDE, CALCIUM CHLORIDE 600; 310; 30; 20 MG/100ML; MG/100ML; MG/100ML; MG/100ML
INJECTION, SOLUTION INTRAVENOUS CONTINUOUS
Status: DISCONTINUED | OUTPATIENT
Start: 2025-05-19 | End: 2025-05-19

## 2025-05-19 RX ORDER — NICOTINE POLACRILEX 4 MG
30 LOZENGE BUCCAL
Status: DISCONTINUED | OUTPATIENT
Start: 2025-05-19 | End: 2025-05-19

## 2025-05-19 RX ORDER — DEXTROSE MONOHYDRATE 25 G/50ML
50 INJECTION, SOLUTION INTRAVENOUS
Status: DISCONTINUED | OUTPATIENT
Start: 2025-05-19 | End: 2025-05-19

## 2025-05-19 RX ORDER — NICOTINE POLACRILEX 4 MG
15 LOZENGE BUCCAL
Status: DISCONTINUED | OUTPATIENT
Start: 2025-05-19 | End: 2025-05-19

## 2025-05-19 RX ORDER — MORPHINE SULFATE 4 MG/ML
2 INJECTION, SOLUTION INTRAMUSCULAR; INTRAVENOUS EVERY 10 MIN PRN
Status: DISCONTINUED | OUTPATIENT
Start: 2025-05-19 | End: 2025-05-19

## 2025-05-19 RX ORDER — GLYCOPYRROLATE 0.2 MG/ML
INJECTION, SOLUTION INTRAMUSCULAR; INTRAVENOUS AS NEEDED
Status: DISCONTINUED | OUTPATIENT
Start: 2025-05-19 | End: 2025-05-19 | Stop reason: SURG

## 2025-05-19 RX ORDER — FAMOTIDINE 10 MG/ML
20 INJECTION, SOLUTION INTRAVENOUS ONCE
Status: COMPLETED | OUTPATIENT
Start: 2025-05-19 | End: 2025-05-19

## 2025-05-19 RX ORDER — HYDROMORPHONE HYDROCHLORIDE 1 MG/ML
0.4 INJECTION, SOLUTION INTRAMUSCULAR; INTRAVENOUS; SUBCUTANEOUS EVERY 5 MIN PRN
Status: DISCONTINUED | OUTPATIENT
Start: 2025-05-19 | End: 2025-05-19

## 2025-05-19 RX ORDER — MORPHINE SULFATE 4 MG/ML
4 INJECTION, SOLUTION INTRAMUSCULAR; INTRAVENOUS EVERY 10 MIN PRN
Status: DISCONTINUED | OUTPATIENT
Start: 2025-05-19 | End: 2025-05-19

## 2025-05-19 RX ORDER — DOXYCYCLINE 100 MG/1
100 CAPSULE ORAL 2 TIMES DAILY
Qty: 14 CAPSULE | Refills: 0 | Status: SHIPPED | OUTPATIENT
Start: 2025-05-19 | End: 2025-05-26

## 2025-05-19 RX ORDER — HYDROMORPHONE HYDROCHLORIDE 1 MG/ML
0.2 INJECTION, SOLUTION INTRAMUSCULAR; INTRAVENOUS; SUBCUTANEOUS EVERY 5 MIN PRN
Status: DISCONTINUED | OUTPATIENT
Start: 2025-05-19 | End: 2025-05-19

## 2025-05-19 RX ORDER — PROGESTERONE 100 MG/1
CAPSULE ORAL
Qty: 36 CAPSULE | Refills: 4 | Status: SHIPPED | OUTPATIENT
Start: 2025-05-19

## 2025-05-19 RX ORDER — HYDROMORPHONE HYDROCHLORIDE 1 MG/ML
0.6 INJECTION, SOLUTION INTRAMUSCULAR; INTRAVENOUS; SUBCUTANEOUS EVERY 5 MIN PRN
Status: DISCONTINUED | OUTPATIENT
Start: 2025-05-19 | End: 2025-05-19

## 2025-05-19 RX ORDER — NALOXONE HYDROCHLORIDE 0.4 MG/ML
0.08 INJECTION, SOLUTION INTRAMUSCULAR; INTRAVENOUS; SUBCUTANEOUS AS NEEDED
Status: DISCONTINUED | OUTPATIENT
Start: 2025-05-19 | End: 2025-05-19

## 2025-05-19 RX ORDER — ACETAMINOPHEN 500 MG
1000 TABLET ORAL ONCE
Status: COMPLETED | OUTPATIENT
Start: 2025-05-19 | End: 2025-05-19

## 2025-05-19 RX ORDER — METOCLOPRAMIDE HYDROCHLORIDE 5 MG/ML
10 INJECTION INTRAMUSCULAR; INTRAVENOUS ONCE
Status: COMPLETED | OUTPATIENT
Start: 2025-05-19 | End: 2025-05-19

## 2025-05-19 RX ADMIN — MIDAZOLAM HYDROCHLORIDE 2 MG: 1 INJECTION INTRAMUSCULAR; INTRAVENOUS at 12:24:00

## 2025-05-19 RX ADMIN — GLYCOPYRROLATE 0.2 MG: 0.2 INJECTION, SOLUTION INTRAMUSCULAR; INTRAVENOUS at 12:31:00

## 2025-05-19 NOTE — TELEPHONE ENCOUNTER
progesterone 100 MG Oral Cap, Take at bedtime for 12 consecutive nights monthly, Disp: 36 capsule, Rfl: 4    Message:.. is the patient to take by mouth?

## 2025-05-19 NOTE — DISCHARGE INSTRUCTIONS
HOME INSTRUCTIONS  [facility] HOME CARE INSTRUCTIONS: POST-OP ANESTHESIA  The medication that you received for sedation or general anesthesia can last up to 24 hours. Your judgment and reflexes may be altered, even if you feel like your normal self.      We Recommend:   Do not drive any motor vehicle or bicycle   Avoid mowing the lawn, playing sports, or working with power tools/applicances (power saws, electric knives or mixers)   That you have someone stay with you on your first night home   Do not drink alcohol or take sleeping pills or tranquilizers   Do not sign legal documents within 24 hours of your procedure   If you had a nerve block for your surgery, take extra care not to put any pressure on your arm or hand for 24 hours    It is normal:  For you to have a sore throat if you had a breathing tube during surgery (while you were asleep!). The sore throat should get better within 48 hours. You can gargle with warm salt water (1/2 tsp in 4 oz warm water) or use a throat lozenge for comfort  To feel muscle aches or soreness especially in the abdomen, chest or neck. The achy feeling should go away in the next 24 hours  To feel weak, sleepy or \"wiped out\". Your should start feeling better in the next 24 hours.   To experience mild discomforts such as sore lip or tongue, headache, cramps, gas pains or a bloated feeling in your abdomen.   To experience mild back pain or soreness for a day or two if you had spinal or epidural anesthesia.   If you had laparoscopic surgery, to feel shoulder pain or discomfort on the day of surgery.   For some patients to have nausea after surgery/anesthesia    If you feel nausea or experience vomiting:   Try to move around less.   Eat less than usual or drink only liquids until the next morning   Nausea should resolve in about 24 hours    If you have a problem when you are at home:    Call your surgeons office   Discharge Instructions: After Your Surgery  You’ve just had surgery. During  surgery, you were given medicine called anesthesia to keep you relaxed and free of pain. After surgery, you may have some pain or nausea. This is common. Here are some tips for feeling better and getting well after surgery.   Going home  Your healthcare provider will show you how to take care of yourself when you go home. They'll also answer your questions. Have an adult family member or friend drive you home. For the first 24 hours after your surgery:   Don't drive or use heavy equipment.  Don't make important decisions or sign legal papers.  Take medicines as directed.  Don't drink alcohol.  Have someone stay with you, if needed. They can watch for problems and help keep you safe.  Be sure to go to all follow-up visits with your healthcare provider. And rest after your surgery for as long as your provider tells you to.   Coping with pain  If you have pain after surgery, pain medicine will help you feel better. Take it as directed, before pain becomes severe. Also, ask your healthcare provider or pharmacist about other ways to control pain. This might be with heat, ice, or relaxation. And follow any other instructions your surgeon or nurse gives you.      Stay on schedule with your medicine.     Tips for taking pain medicine  To get the best relief possible, remember these points:   Pain medicines can upset your stomach. Taking them with a little food may help.  Most pain relievers taken by mouth need at least 20 to 30 minutes to start to work.  Don't wait till your pain becomes severe before you take your medicine. Try to time your medicine so that you can take it before starting an activity. This might be before you get dressed, go for a walk, or sit down for dinner.  Constipation is a common side effect of some pain medicines. Call your healthcare provider before taking any medicines, such as laxatives or stool softeners, to help ease constipation. Also ask if you should skip any foods. Drinking lots of fluids  and eating foods, such as fruits and vegetables, that are high in fiber can also help. Remember, don't take laxatives unless your surgeon has prescribed them.  Drinking alcohol and taking pain medicine can cause dizziness and slow your breathing. It can even be deadly. Don't drink alcohol while taking pain medicine.  Pain medicine can make you react more slowly to things. Don't drive or run machinery while taking pain medicine.  Your healthcare provider may tell you to take acetaminophen to help ease your pain. Ask them how much you're supposed to take each day. Acetaminophen or other pain relievers may interact with your prescription medicines or other over-the-counter (OTC) medicines. Some prescription medicines have acetaminophen and other ingredients in them. Using both prescription and OTC acetaminophen for pain can cause you to accidentally overdose. Read the labels on your OTC medicines with care. This will help you to clearly know the list of ingredients, how much to take, and any warnings. It may also help you not take too much acetaminophen. If you have questions or don't understand the information, ask your pharmacist or healthcare provider to explain it to you before you take the OTC medicine.   Managing nausea  Some people have an upset stomach (nausea) after surgery. This is often because of anesthesia, pain, or pain medicine, less movement of food in the stomach, or the stress of surgery. These tips will help you handle nausea and eat healthy foods as you get better. If you were on a special food plan before surgery, ask your healthcare provider if you should follow it while you get better. Check with your provider on how your eating should progress. It may depend on the surgery you had. These general tips may help:   Don't push yourself to eat. Your body will tell you when to eat and how much.  Start off with clear liquids and soup. They're easier to digest.  Next try semi-solid foods as you feel  ready. These include mashed potatoes, applesauce, and gelatin.  Slowly move to solid foods. Don’t eat fatty, rich, or spicy foods at first.  Don't force yourself to have 3 large meals a day. Instead eat smaller amounts more often.  Take pain medicines with a small amount of solid food, such as crackers or toast. This helps prevent nausea.  When to call your healthcare provider  Call your healthcare provider right away if you have any of these:   You still have too much pain, or the pain gets worse, after taking the medicine. The medicine may not be strong enough. Or there may be a complication from the surgery.  You feel too sleepy, dizzy, or groggy. The medicine may be too strong.  Side effects, such as nausea or vomiting. Your healthcare provider may advise taking other medicines to treat these or may change your treatment plan..  Skin changes, such as rash, itching, or hives. This may mean you have an allergic reaction. Your provider may advise taking other medicines.  The incision looks different (for instance, part of it opens up).  Bleeding or fluid leaking from the incision site, and you weren't told to expect that.  Fever of 100.4°F (38°C) or higher, or as directed by your healthcare provider.  Call 911  Call 911 right away if you have:   Trouble breathing  Facial swelling    If you have obstructive sleep apnea   You were given anesthesia medicine during surgery to keep you comfortable and free of pain. After surgery, you may have more apnea spells because of this medicine and other medicines you were given. The spells may last longer than normal.    At home:  Keep using the continuous positive airway pressure (CPAP) device when you sleep. Unless your healthcare provider tells you not to, use it when you sleep, day or night. CPAP is a common device used to treat obstructive sleep apnea.  Talk with your provider before taking any pain medicine, muscle relaxants, or sedatives. Your provider will tell you about  the possible dangers of taking these medicines.  Contact your provider if your sleeping changes a lot even when taking medicines as directed.  [employer] last reviewed this educational content on 4/1/2024  This information is for informational purposes only. This is not intended to be a substitute for professional medical advice, diagnosis, or treatment. Always seek the advice and follow the directions from your physician or other qualified health care provider.  © 2927-8311 [employer]. All rights reserved. This information is not intended as a substitute for professional medical care. Always follow your healthcare professional's instructions.  or Exam  Priority: Routine  Comments: OTC Motrin 600mg every 6 hours as needed for pain alternating with extra strength tylenol every 6 hours - Call if bleeding soaking a pad in <1 hr or fever >100.6 degrees or pain not resolved with ice or OTC Motrin. Follow up appointment in 2-3 weeks.      Dorie Hernández MD     Home Care Instructions Following Your Hysteroscopy     Paige-  We hope you were pleased with your care at [facility].  We wish you the best outcome and overall experience with your operation.  These instructions will help to minimize pain, limit your risk of infection, and improve the likelihood of a successful result.    What to Expect  Expect some vaginal bleeding, watery vaginal discharge, or spotting for 1-2 weeks after your procedure  You might also experience abdominal cramping for 1-2 days  Call the office, if you experience heavy bleeding (saturating a pad every hour).    Bathing/Showers  You can resume showering tomorrow.   No baths, swimming, or hot tubs for one week.    Over-The-Counter Medication  Non-prescription anti-inflammatory medications can also help to ease the pain.  You can take Aleve or ibuprofen   Take as directed on the bottle  Drink a full glass of water with the medication. Do not take medication on an empty  stomach.    Home Medication  Resume your home medications as instructed    Diet  Resume your normal diet    Activity  No strenuous activity for 24 hours.  You can go up and down the stairs as tolerated.  Use common sense.    Driving  Do not drive for 24 hours    Follow-up Appointment with   Call for an appointment in 2 weeks.    Verify your appointment date, day, time, and location.  At your 1st postoperative office visit: Your progress will be evaluated.  Any concerns and instructions will be discussed.    Questions or Concerns  Call  's office if you experience heavy bleeding, uncontrolled pain, foul-smelling vaginal discharge, shortness of breath, fever, or other concerns.  If your call is made after office hours, a physician will be available to help you.    Paige  Thank you for coming to [facility] for your operation.  The nurses and the anesthesiologist try very hard to make sure you receive the best care possible.  Your trust in them is greatly appreciated.    Thanks so much,

## 2025-05-19 NOTE — OPERATIVE REPORT
[facility] POST ANESTHESIA CARE UNIT  Operative Note     Paige Freire Location: OR   [facility] [id number] MRN [id number]   Admission Date 5/19/2025 Operation Date 5/19/2025   Attending Physician Dorie Hernández MD Operating Physician Dorie Hernández MD      Preoperative Diagnosis: Endometrial polyp [N84.0]     Postoperative Diagnosis: Endometrial polyp [N84.0]     Procedure Performed:   Hysteroscopy with polypectomy     Primary Surgeon: Dorie Hernández MD      Assistant: none      Surgical Findings: inflammed endometrium with small polyp      Anesthesia: MAC     Complications: none      Implants: * No implants in log *     Specimen: endometrial curetting     Drains: none     Condition: stable      Estimated Blood Loss: No data recorded     Summary of Case:   The patient was taken to the operating room.  She was then prepped and draped in the normal sterile fashion. She was placed in the dorsal lithotomy position using the Dixon stirrups, correct positioning was verified twice.  She was then prepped and draped in the normal sterile fashion. Time out procedure was done  A weighted vaginal speculum was placed the anterior lip of the cervix was grasped with a ring forceps.  First an ECC was done.  The cervix was then serially dilated using cervical dilators.  The hysteroscope was placed into endocervical canal and under direct  visualization the uterine cavity was entered.  The uterine cavity was normal with both tubal ostia being seen.  Using the Myosure, a directed D & C was done in all quadrants. Hysteroscopy was removed and a D & C was done with syed gerardo - all specimens sent to lab.  The tenaculum was remove, site was hemostatic.  Patient was taken out of dorsal lithotomy position, awaken and taken to BERT in stable condition.  Post procedure time out was done. All OR counts were correct.      Dorie Hernández MD  5/19/2025  12:56 PM

## 2025-05-19 NOTE — INTERVAL H&P NOTE
Pre-op Diagnosis: Endometrial polyp [N84.0]    The above referenced H&P was reviewed by Dorie Hernández MD on 5/19/2025, the patient was examined and no significant changes have occurred in the patient's condition since the H&P was performed.  I discussed with the patient and/or legal representative the potential benefits, risks and side effects of this procedure; the likelihood of the patient achieving goals; and potential problems that might occur during recuperation.  I discussed reasonable alternatives to the procedure, including risks, benefits and side effects related to the alternatives and risks related to not receiving this procedure.  We will proceed with procedure as planned.

## 2025-05-19 NOTE — ANESTHESIA PREPROCEDURE EVALUATION
Anesthesia PreOp Note    HPI:     Paige Freire is a 50 year old female who presents for preoperative consultation requested by: Dorie Hernández MD    Date of Surgery: 5/19/2025    Procedure(s):  Hysteroscopy with polypectomy, considering Novasure endometrial ablation  ENDOMETRIAL ABLATION  Indication: Endometrial polyp [N84.0]    Relevant Problems   No relevant active problems       NPO:                         History Review:  Patient Active Problem List    Diagnosis Date Noted    Azotemia 01/25/2023    Hyperglycemia 01/25/2023    Acute asthma (HCC) 01/25/2023    Mild intermittent asthma without complication (HCC) 12/30/2021    EMPERATRIZ positive 12/30/2021    Skin sensation disturbance 09/14/2021    Pain in left arm 09/14/2021    Intractable chronic migraine without aura 09/14/2021    Benign intracranial hypertension 09/14/2021    Globus pharyngeus 02/20/2017    LPRD (laryngopharyngeal reflux disease) 02/20/2017    History of Lyme disease 02/07/2017    Thickened endometrium 12/30/2015    Irregular menstrual bleeding 12/30/2015    Thoracic outlet syndrome 01/16/2013    Contrast media allergy 05/17/2011    Anxiety state, unspecified 01/08/2008    Carpal tunnel syndrome 03/23/2005    Other malaise and fatigue 03/23/2005       Past Medical History[1]    Past Surgical History[2]    Prescriptions Prior to Admission[3]  Current Medications and Prescriptions Ordered in Epic[4]    Allergies[5]    Family History[6]  Social Hx on file[7]    Available pre-op labs reviewed.             Vital Signs:  Body mass index is 38.09 kg/m².   height is 1.6 m (5' 3\") and weight is 97.5 kg (215 lb). Her blood pressure is 147/80 and her pulse is 53. Her respiration is 15 and oxygen saturation is 98%.   Vitals:    05/15/25 1753 05/19/25 1123   BP:  147/80   Pulse:  53   Resp:  15   SpO2:  98%   Weight: 97.1 kg (214 lb) 97.5 kg (215 lb)   Height: 1.6 m (5' 3\") 1.6 m (5' 3\")        Anesthesia Evaluation     Patient summary reviewed and  Nursing notes reviewed    No history of anesthetic complications   Airway   Mallampati: II  TM distance: >3 FB  Neck ROM: full  Dental      Pulmonary    (+) asthma  Cardiovascular   Exercise tolerance: good  (+) hypertension    Neuro/Psych    (+)  anxiety/panic attacks,        GI/Hepatic/Renal    (+) GERD    Endo/Other    (+) diabetes mellitus  Abdominal                  Anesthesia Plan:   ASA:  2  Plan:   General  Post-op Pain Management: IV analgesics  Informed Consent Plan and Risks Discussed With:  Patient  Discussed plan with:  Surgeon      I have informed Paige Freire and/or legal guardian or family member of the nature of the anesthetic plan, benefits, risks including possible dental damage if relevant, major complications, and any alternative forms of anesthetic management.   All of the patient's questions were answered to the best of my ability. The patient desires the anesthetic management as planned.  Ashley Mack MD  5/19/2025 11:25 AM  Present on Admission:   Irregular menstrual bleeding           [1]   Past Medical History:   Allergic rhinitis    Amenorrhea    Had break through bleeding feb 25    Asthma (HCC)    Back problem    Diabetes mellitus (HCC)    gestational    Disorder of liver    fatty liver    Endometriosis    Ultrasound said    Esophageal reflux    Fibroids    Ultrasound said i have. Also been told 20+ years ago    Fibromyalgia    High blood pressure    Hx of motion sickness    Hyperlipidemia    Infectious disease    Lymes    Lupus    Lyme disease    Migraine headache with aura    Migraine headache without aura    Muscle weakness    r/t lupus and fibromyalgia    OTHER DISEASES    LYME DISEASE    Personal history of other diseases of respiratory system    asthma    SEASONAL ALLERGIES    Unspecified essential hypertension    Visual impairment    glasses for reading   [2]   Past Surgical History:  Procedure Laterality Date    Create eardrum opening,gen anesth      Cryocautery of cervix  I  was around 21    D & c  2014    Other surgical history  06/01/2003    NOSE SURGERY   [3]   Medications Prior to Admission   Medication Sig Dispense Refill Last Dose/Taking    ASHWAGANDHA OR Take 1 tablet by mouth daily.   5/15/2025    Misc Natural Products (BEET ROOT OR) Take 1 capsule by mouth daily.   5/15/2025    L-THEANINE OR Take 1 tablet by mouth daily.   5/15/2025    TURMERIC OR Take 1 capsule by mouth daily.   5/15/2025    Triamterene-HCTZ 37.5-25 MG Oral Tab Take 1 tablet by mouth in the morning.   Taking    albuterol (PROAIR HFA) 108 (90 Base) MCG/ACT Inhalation Aero Soln 2 puffs every 4-6 hours prn wheezing or shortness of breath 1 each 0 Taking    Magnesium Gluconate 500 MG Oral Tab Take 0.8 tablets (400 mg total) by mouth at bedtime.   Taking    Melatonin 10 MG Oral Tab Take 1 tablet by mouth nightly as needed.   Taking As Needed    ZYRTEC 10 MG OR TABS Take 1 tablet (10 mg total) by mouth in the morning.   Taking    estradiol (ESTRACE) 0.1 MG/GM Vaginal Cream Place 1 g vaginally every evening for 7 days, THEN 1 g twice a week. 42.5 g 3     estradiol (MINIVELLE) 0.075 MG/24HR Transdermal Patch Biweekly Place 1 patch onto the skin twice a week. 24 patch 4     progesterone 100 MG Oral Cap Take nightly for 10 nights every three months 40 capsule 1    [4]   No current Epic-ordered facility-administered medications on file.     No current Epic-ordered outpatient medications on file.   [5]   Allergies  Allergen Reactions    Zolmitriptan HIVES, ITCHING and JITTERY    Biaxin Oral Suspension [Clarithromycin] NAUSEA AND VOMITING    Contrast Dye [Gadolinium Derivatives] NAUSEA AND VOMITING     Patient states this was during a ct scan, not during her MRI -has had MRI with contrast w/o issues    Pcns [Penicillins] OTHER (SEE COMMENTS)     NOT SURE OF REACTION HAD REACTION AS CHILD AND NEVER TAKEN MED SINCE  Denies hospitalization    Seasonal OTHER (SEE COMMENTS)     Nasal congestion    Shellfish     Sulfa  Antibiotics UNKNOWN    Latex RASH   [6]   Family History  Problem Relation Age of Onset    Hypertension Father     Lipids Father     Allergies Father     Prostate Cancer Father     Heart Disorder Mother     Hypertension Mother     Lipids Mother     Allergies Mother     Thyroid disease Mother         hypo thyroid cyst or lump    Asthma Mother     Arthritis Mother     Other (Other) Mother         osteoarthritis    Heart Disorder Maternal Grandmother     Hypertension Maternal Grandmother     Lipids Maternal Grandmother     Obesity Maternal Grandmother     Arthritis Maternal Grandmother     Heart Attack Maternal Grandmother     Heart Disease Maternal Grandmother     Stroke Maternal Grandmother     Psychiatric Maternal Grandfather     Heart Disorder Maternal Grandfather     Ear Problems Maternal Grandfather         mastoid    Cancer Paternal Grandmother         brain    Diabetes Paternal Grandmother     Cancer Paternal Grandfather         colon   [7]   Social History  Socioeconomic History    Marital status:    Tobacco Use    Smoking status: Never     Passive exposure: Never    Smokeless tobacco: Never   Vaping Use    Vaping status: Never Used   Substance and Sexual Activity    Alcohol use: Not Currently    Drug use: Never    Sexual activity: Yes     Partners: Male     Birth control/protection: Vasectomy   Other Topics Concern    Caffeine Concern No    Exercise No    Seat Belt Yes    Special Diet No    Stress Concern No    Weight Concern Yes

## 2025-05-19 NOTE — ANESTHESIA POSTPROCEDURE EVALUATION
Patient: Paige Freire    Procedure Summary       Date: 05/19/25 Room / Location: [facility] MAIN OR  / [facility] MAIN OR    Anesthesia Start: 1224 Anesthesia Stop: 1252    Procedure: Hysteroscopy with polypectomy (Uterus) Diagnosis:       Endometrial polyp      (Endometrial polyp [N84.0])    Surgeons: Dorie Hernández MD Anesthesiologist: Ashley Mack MD    Anesthesia Type: general ASA Status: 2            Anesthesia Type: general    Vitals Value Taken Time   /92 05/19/25 12:52   Temp 97.8 °F (36.6 °C) 05/19/25 12:52   Pulse 63 05/19/25 12:52   Resp 10 05/19/25 12:52   SpO2 97 % 05/19/25 12:52   Vitals shown include unfiled device data.    [facility] AN Post Evaluation:   Patient Evaluated in PACU  Patient Participation: complete - patient participated  Level of Consciousness: awake  Pain Score: 0  Pain Management: adequate  Airway Patency:patent  Dental exam unchanged from preop  Yes    Cardiovascular Status: acceptable  Respiratory Status: acceptable  Postoperative Hydration acceptable      Ashley Mack MD  5/19/2025 12:53 PM

## 2025-05-19 NOTE — H&P
[facility]  part of [facility]        HISTORY AND PHYSICAL        Subjective   Chief Complaint:  menorrhagia       History of Present Illness:    Paige Freire is a  50 year old y/o  who presents for scheduled gyn procedure  Hysteroscopy with polypectomy .  The patients complaints include menorrhagia .          Past Medical History[1]    Past Surgical History[2]    OB History    Para Term  AB Living   2 2 1 1 0 2   SAB IAB Ectopic Multiple Live Births   0 0 0 0 2       Allergies[3]    Medications - Current[4]      Family History[5]      REVIEW OF SYSTEMS:   CONSTITUTIONAL: Negative for fever, chills, diaphoresis, weakness, fatigue, weight loss, weight gain.  ALLERGIES: Negative for urticaria, hay fever, angioedema  EYES: Negative for blurry vision, decreased vision, loss of vision, eye pain, diplopia, photophobia, discharge  ENT: Negative for sore throat, nasal congestion, nasal discharge, epistaxis, tinnitus, hearing loss  CARDIOVASCULAR: Negative for chest pain, dyspnea on exertion, orthopnea, paroxysmal nocturnal dyspnea, edema, palpitations  RESPIRATORY: Negative for cough, hemoptysis, shortness of breath, pleuritic chest pain, wheezing  BREAST:  Denies breast mass, breast pain, nipple discharge or nipple pain.  ENDOCRINE: Negative for polydipsia/polyuria, palpitations, skin changes, temperature intolerance, unexpected weight changes  HEME-LYMPH: Negative for swollen lymph nodes, bleeding, bruising  GI: Negative abdominal pain, flank pain, nausea, vomiting, diarrhea, constipation, black stool, blood in stool  : Negative for dysuria, frequency/urgency, hematuria, genital discharge, vaginal bleeding, irregular menses, heavy menses, pelvic pain  NEURO: Negative for dizzy/vertigo, headache, focal weakness, numbness/tingling, speech problems, loss of consciousness, confusion, memory loss  MUSCULOSKELETAL: Negative for back pain, joint pain, joint stiffness, joint  swelling, muscle pain, muscle weakness  SKIN: Negative for rash, itching, hives  PSYCH: Negative for anxiety, depression, physical abuse, sexual abuse      PHYSICAL EXAM:    Ht 5' 3\" (1.6 m)   Wt 214 lb (97.1 kg)   LMP 02/12/2025 (Approximate)   BMI 37.91 kg/m²        General   Mental Status - Alert. General Appearance - Cooperative. Orientation - Oriented X4. Build & Nutrition - Well nourished.    Head and Neck  Thyroid   Gland Characteristics - normal size and consistency.    Chest and Lung Exam   Inspection:   Chest Wall: - Normal.  Percussion:   Quality and Intensity: - Percussion normal.  Palpation: - Palpation normal.  Auscultation:   Breath sounds: - Normal.  Adventitious sounds: - No Adventitious sounds.      Cardiovascular   Auscultation: Rhythm - Regular. Heart Sounds - Normal heart sounds.  Murmurs & Other Heart Sounds: Auscultation of the heart reveals - No Murmurs.      Abdomen   Inspection: Inspection of the abdomen reveals - No Hernias. Incisional scars - No incisional scars.  Palpation/Percussion: Palpation and Percussion of the abdomen reveal - Non Tender and No Palpable abdominal masses.  Liver: - Normal.  Auscultation: Auscultation of the abdomen reveals - Bowel sounds normal.      Female Genitourinary     External Genitalia   Perineum - Normal. Bartholin's Gland - Bilateral - Normal. Clitoris - Normal.  Introitus: Characteristics - No Cystocele, Enterocele or Rectocele. Discharge - None.  Labia Majora: Lesions - Bilateral - None. Characteristics - Bilateral - Normal.  Labia Minora: Lesions - Bilateral - None. Characteristics - Bilateral - Normal.  Urethra: Characteristics - Normal. Discharge - None.  Boscobel Gland - Bilateral - Normal.  Vulva: Characteristics - Normal. Lesions - None.    Speculum & Bimanual   Vagina:   Vaginal Wall: - Normal.  Vaginal Lesions - None. Vaginal Mucosa - Normal.  Cervix: Characteristics - No Motion tenderness. Discharge - None.  Uterus: Characteristics - Normal.  Position - Midposition.  Adnexa: Characteristics - Bilateral - Normal. Masses - No Adnexal Masses.      Peripheral Vascular   Upper Extremity:   Palpation: - Pulses bilaterally normal.  Lower Extremity: Inspection - Bilateral - Inspection Normal.  Palpation: Edema - Bilateral - No edema.      Neurologic   Mental Status: - Normal.      Lymphatic  General Lymphatics   Description - Normal .      Lab Results   Component Value Date    WBC 10.0 02/27/2023    HGB 13.4 02/27/2023    HCT 40.4 02/27/2023    .0 02/27/2023    MCV 85.6 02/27/2023    RDW 14.2 02/27/2023     No components found for: \"ABOGROUP\", \"RHTYPE\", \"RUBIGG\"            Assessment and Plan:      Active Problems:    Irregular menstrual bleeding        The patient was counseled regarding surgery and the procedure (Hysteroscopy with polypectomy ) was reviewed at length.   Risks of procedure including bleeding/need for blood transfusion (<1%), infection (5-10%), damage to other organs/bowel/bladder/ureters (<1%),  and anesthesia were reviewed.  Benefits, alternatives, & indications were also discussed.  All questions were answered.  Written information was provided.      Dorie Hernández MD         [1]   Past Medical History:   Allergic rhinitis    Amenorrhea    Had break through bleeding feb 25    Asthma (HCC)    Back problem    Diabetes mellitus (HCC)    gestational    Disorder of liver    fatty liver    Endometriosis    Ultrasound said    Esophageal reflux    Fibroids    Ultrasound said i have. Also been told 20+ years ago    Fibromyalgia    High blood pressure    Hx of motion sickness    Hyperlipidemia    Infectious disease    Lymes    Lupus    Lyme disease    Migraine headache with aura    Migraine headache without aura    Muscle weakness    r/t lupus and fibromyalgia    OTHER DISEASES    LYME DISEASE    Personal history of other diseases of respiratory system    asthma    SEASONAL ALLERGIES    Unspecified essential hypertension    Visual impairment     glasses for reading   [2]   Past Surgical History:  Procedure Laterality Date    Create eardrum opening,gen anesth      Cryocautery of cervix  I was around 21    D & c  2014    Other surgical history  06/01/2003    NOSE SURGERY   [3]   Allergies  Allergen Reactions    Zolmitriptan HIVES, ITCHING and JITTERY    Biaxin Oral Suspension [Clarithromycin] NAUSEA AND VOMITING    Contrast Dye [Gadolinium Derivatives] NAUSEA AND VOMITING     Patient states this was during a ct scan, not during her MRI -has had MRI with contrast w/o issues    Pcns [Penicillins] OTHER (SEE COMMENTS)     NOT SURE OF REACTION HAD REACTION AS CHILD AND NEVER TAKEN MED SINCE  Denies hospitalization    Seasonal OTHER (SEE COMMENTS)     Nasal congestion    Shellfish     Sulfa Antibiotics UNKNOWN    Latex RASH   [4]   Current Outpatient Medications:     ASHWAGANDHA OR, Take 1 tablet by mouth daily., Disp: , Rfl:     Misc Natural Products (BEET ROOT OR), Take 1 capsule by mouth daily., Disp: , Rfl:     L-THEANINE OR, Take 1 tablet by mouth daily., Disp: , Rfl:     TURMERIC OR, Take 1 capsule by mouth daily., Disp: , Rfl:     Triamterene-HCTZ 37.5-25 MG Oral Tab, Take 1 tablet by mouth in the morning., Disp: , Rfl:     albuterol (PROAIR HFA) 108 (90 Base) MCG/ACT Inhalation Aero Soln, 2 puffs every 4-6 hours prn wheezing or shortness of breath, Disp: 1 each, Rfl: 0    Magnesium Gluconate 500 MG Oral Tab, Take 0.8 tablets (400 mg total) by mouth at bedtime., Disp: , Rfl:     Melatonin 10 MG Oral Tab, Take 1 tablet by mouth nightly as needed., Disp: , Rfl:     ZYRTEC 10 MG OR TABS, Take 1 tablet (10 mg total) by mouth in the morning., Disp: , Rfl:     estradiol (ESTRACE) 0.1 MG/GM Vaginal Cream, Place 1 g vaginally every evening for 7 days, THEN 1 g twice a week., Disp: 42.5 g, Rfl: 3    estradiol (MINIVELLE) 0.075 MG/24HR Transdermal Patch Biweekly, Place 1 patch onto the skin twice a week., Disp: 24 patch, Rfl: 4    progesterone 100 MG Oral Cap,  Take nightly for 10 nights every three months, Disp: 40 capsule, Rfl: 1  [5]   Family History  Problem Relation Age of Onset    Hypertension Father     Lipids Father     Allergies Father     Prostate Cancer Father     Heart Disorder Mother     Hypertension Mother     Lipids Mother     Allergies Mother     Thyroid disease Mother         hypo thyroid cyst or lump    Asthma Mother     Arthritis Mother     Other (Other) Mother         osteoarthritis    Heart Disorder Maternal Grandmother     Hypertension Maternal Grandmother     Lipids Maternal Grandmother     Obesity Maternal Grandmother     Arthritis Maternal Grandmother     Heart Attack Maternal Grandmother     Heart Disease Maternal Grandmother     Stroke Maternal Grandmother     Psychiatric Maternal Grandfather     Heart Disorder Maternal Grandfather     Ear Problems Maternal Grandfather         mastoid    Cancer Paternal Grandmother         brain    Diabetes Paternal Grandmother     Cancer Paternal Grandfather         colon

## 2025-05-20 ENCOUNTER — TELEPHONE (OUTPATIENT)
Dept: OBGYN CLINIC | Facility: CLINIC | Age: 50
End: 2025-05-20

## 2025-05-20 NOTE — PROGRESS NOTES
Endometrium; curettage:   Fragments of endocervical and ectocervical mucosa with florid chronic cervicitis. (Why I Sent Doxy to Pharmacy)  Superficial strips of endometrial tissue admixed with blood, see comment.   No evidence of carcinoma identified.  Dorie Hernández MD

## 2025-05-27 ENCOUNTER — OFFICE VISIT (OUTPATIENT)
Dept: OBGYN CLINIC | Facility: CLINIC | Age: 50
End: 2025-05-27

## 2025-05-27 VITALS
HEIGHT: 63 IN | SYSTOLIC BLOOD PRESSURE: 124 MMHG | BODY MASS INDEX: 37.39 KG/M2 | DIASTOLIC BLOOD PRESSURE: 88 MMHG | WEIGHT: 211 LBS

## 2025-05-27 DIAGNOSIS — Z79.890 HORMONE REPLACEMENT THERAPY (HRT): ICD-10-CM

## 2025-05-27 DIAGNOSIS — N95.2 ATROPHIC VAGINITIS: ICD-10-CM

## 2025-05-27 DIAGNOSIS — R10.31 RLQ DISCOMFORT: Primary | ICD-10-CM

## 2025-05-27 DIAGNOSIS — N84.0 ENDOMETRIAL POLYP: ICD-10-CM

## 2025-05-27 PROCEDURE — 99213 OFFICE O/P EST LOW 20 MIN: CPT | Performed by: OBSTETRICS & GYNECOLOGY

## 2025-05-27 PROCEDURE — 3079F DIAST BP 80-89 MM HG: CPT | Performed by: OBSTETRICS & GYNECOLOGY

## 2025-05-27 PROCEDURE — 3074F SYST BP LT 130 MM HG: CPT | Performed by: OBSTETRICS & GYNECOLOGY

## 2025-05-27 PROCEDURE — 3008F BODY MASS INDEX DOCD: CPT | Performed by: OBSTETRICS & GYNECOLOGY

## 2025-05-27 RX ORDER — DOXYCYCLINE 100 MG/1
TABLET ORAL
COMMUNITY
Start: 2025-05-25

## 2025-05-27 RX ORDER — ESTRADIOL 0.1 MG/G
CREAM VAGINAL
Qty: 42.5 G | Refills: 3 | Status: SHIPPED | OUTPATIENT
Start: 2025-05-27

## 2025-05-27 RX ORDER — PROGESTERONE 100 MG/1
CAPSULE ORAL
Qty: 36 CAPSULE | Refills: 4 | Status: SHIPPED | OUTPATIENT
Start: 2025-05-27

## 2025-05-27 NOTE — PROGRESS NOTES
Post-op follow up.        Visit Type:  Post-operative follow-up    Date of Procedure:  05/19/2025    Procedure:  Hysteroscopy with polypectomy    Indications for Procedure:  Endometrial polyp    Pathology Report:  Endometrium; curettage:   · Fragments of endocervical and ectocervical mucosa with florid chronic cervicitis. (Why I Sent Doxy to Pharmacy)  · Superficial strips of endometrial tissue admixed with blood, see comment.   · No evidence of carcinoma identified.      Surgery/Post-op Complications:Patient declines having any      Pain:  Pt is having pain on right lower side. Pt states was there before the surgery, but overall having no pain.      Medications pertaining to Surgery:  Minivelle transdermal patch and Doxycyline  Monohydrate     Incision (if applicable):  Na    Diet: Fair     Voiding:  Fair - 4 on average day     Bowel movements/Flatus:  Fair twice a day     Activity: no change     Problems:  no     /88 (BP Location: Right arm, Patient Position: Sitting, Cuff Size: adult)   Ht 5' 3\" (1.6 m)   Wt 211 lb   LMP 02/12/2025 (Approximate)     Wt Readings from Last 3 Encounters:   05/27/25 211 lb   05/19/25 215 lb   03/27/25 214 lb       Health Maintenance   Topic Date Due    Annual Physical  Never done    Colorectal Cancer Screening  Never done    Asthma Control Test  Never done    Pneumococcal Vaccine: 50+ Years (1 of 2 - PCV) Never done    Mammogram  06/04/2020    Zoster Vaccines (1 of 2) Never done    COVID-19 Vaccine (2 - 2024-25 season) 09/01/2024    DTaP,Tdap,and Td Vaccines (2 - Td or Tdap) 02/23/2025    Influenza Vaccine (Season Ended) 10/01/2025    Pap Smear  03/27/2030    Annual Depression Screening  Completed    Meningococcal B Vaccine  Aged Out         Review of Systems   General: Present- Feeling well. Not Present- Fever.   Female Genitourinary: Not Present- Dysmenorrhea, Dyspareunia, Flank Pain, Frequency, Menstrual Irregularities, Pelvic Pain, Urgency, Urinary Complaints, Vaginal  Bleeding and Vaginal dryness.  Pain: Present- Pain Rating - 0 on a 0-10 scale. RLQ pain persisting, present prior to procedure.  All other systems negative       Physical Exam   The physical exam findings are as follows:     Abdomen   Inspection: - Inspection Normal.  Palpation/Percussion: Palpation and Percussion of the abdomen reveal - Non Tender, No hepatosplenomegaly and No Palpable abdominal masses.    Female Genitourinary     External Genitalia   Perineum - Normal. Bartholin's Gland - Bilateral - Normal. Clitoris - Normal.  Introitus: Characteristics - Normal. Discharge - None.  Labia Majora: Lesions - Bilateral - None. Characteristics - Bilateral - Normal.  Labia Minora: Lesions - Bilateral - None. Characteristics - Bilateral - Normal.  Urethra: Characteristics - Normal. Discharge - None.  Foster Gland - Bilateral - Normal.  Vulva: Characteristics - Normal. Lesions - None.    Speculum & Bimanual   Vagina:   Vaginal Wall: - Normal.  Vaginal Lesions - None. Vaginal Mucosa - Normal.  Cervix: Characteristics - No Motion tenderness. Discharge - None.  Uterus: Characteristics - Non Tender. Position - Midposition.  Adnexa: Characteristics - Bilateral - Tender. Masses - No Adnexal Masses.  Bladder - Normal.    Rectal   Anorectal Exam: External - normal external exam.    Lymphatic  General Lymphatics   Description - Normal .    1. RLQ discomfort    2. Endometrial polyp    3. Hormone replacement therapy (HRT)    4. Atrophic vaginitis      - Physical therapy referral provided for RLQ pain  - Follow up in 1 year or sooner if any abnormal pain or bleeding. Close monitoring of HPV at annual pap  - Continue minivelle transdermal patch and doxycycline monohydrate, if skin irritation persists or worsens patient to call the office to switch from patch to gel  - Start vaginal estrogen    Oliva TREJO-S      Patient seen and examined, Agree with assessment and plan of care documented by PA student.     Dorie Hernández,  MD

## (undated) DEVICE — CANISTER SUCT 3000CC HI FLO DISP FOR FLD MGMT

## (undated) DEVICE — 1016 S-DRAPE IRRIG POUCH 10/BOX: Brand: STERI-DRAPE™

## (undated) DEVICE — HANDPIECE ABLAT DIA6MM ENDOMET IMPED CTRL DEV

## (undated) DEVICE — PAD,NON-ADHERENT,3X8,STERILE,LF,1/PK: Brand: MEDLINE

## (undated) DEVICE — GLOVE SUR 6.5 SENSICARE PIP WHT PWD F

## (undated) DEVICE — SOLUTION IRRIG 3000ML 0.9% NACL FLX CONT

## (undated) DEVICE — HYSTEROSCOPY: Brand: MEDLINE INDUSTRIES, INC.

## (undated) DEVICE — MEDI-VAC NON-CONDUCTIVE SUCTION TUBING: Brand: CARDINAL HEALTH

## (undated) DEVICE — DRAPE,LITHOTOMY,STERILE: Brand: MEDLINE

## (undated) NOTE — LETTER
MINOR CASE LETTER      3/28/2025        Dear Paige,    Your are having a hysteroscopy with polypectomy on Monday, May 19th, 2025 @ 7:30AM at [facility]. Please arrive 2 hours early.     Do not eat or drink anything (including water) after midnight the night before surgery.    If your procedure is scheduled later in the day, then nothing by mouth for 6 hours before arrival to the hospital.    You are to call this office if you have any cold or flu symptoms 2 days before your scheduled surgery.    Please avoid aspirin 7 days before surgery.  Avoid Ibuprofen, Motrin, Aleve, or Naprosyn for 3 days before surgery.    You will be contacted by PreAdmission Testing (PAT) usually within the week before surgery.  They will take a short medical history and let you know if any preoperative testing is needed.    Contact your insurance company to let them know you are having a hysteroscopy with polypectomy done.     Please make arrangements for someone to drive you home after your surgery.    Call our office now to schedule your post-operative appointment for 2 weeks after surgery.    Please feel free to contact our office at [phone] if you have any questions regarding these instructions or your procedure.      Sincerely,    Dorie Hernández MD  [facility], [facility] - OB/GYN  [address]  [facility] [address] [phone]

## (undated) NOTE — ED AVS SNAPSHOT
THE [facility] Emergency Department in [address]    Phone:  [phone]    Fax:  [phone]           Julio Cantor   MRN: [id number]    Department:  THE [facility] Emergency Department in [place]   Date of Visit Si usted tiene algun problema con waddell sequimiento, por favor llame a nuestro adminstrador de marybeth al [phone]    Expect to receive an electronic request (by e-mail or text) to complete a self-assessment the day after your visit.   You may also receiv [facility] [address] ([address]) [address]  ([address]) [address] [facility] ([address]) [address] [place] [address] 30. (8 PATIENT STATED HISTORY:  Patient with dyspnea, fever, and pain to her upper anterior chest for the last few days. Last night, she began with a dry cough. History of asthma. FINDINGS:    LUNGS:  No focal consolidation. Normal vascularity.   CARDIAC:

## (undated) NOTE — ED AVS SNAPSHOT
[facility] in [address] [facility]    Phone:  [phone]    Fax:  [phone]           [facility]   MRN: [id number]    Department:  [facility] in [place]   Date of Visit IF THERE IS ANY CHANGE OR WORSENING OF YOUR CONDITION, CALL YOUR PRIMARY CARE PHYSICIAN AT ONCE OR RETURN IMMEDIATELY TO THE EMERGENCY DEPARTMENT.     If you have been prescribed any medication(s), please fill your prescription right away and begin taking t